# Patient Record
Sex: MALE | Race: WHITE | Employment: FULL TIME | URBAN - METROPOLITAN AREA
[De-identification: names, ages, dates, MRNs, and addresses within clinical notes are randomized per-mention and may not be internally consistent; named-entity substitution may affect disease eponyms.]

---

## 2019-05-30 ENCOUNTER — ANESTHESIA (OUTPATIENT)
Dept: OPERATING ROOM | Age: 64
End: 2019-05-30
Payer: COMMERCIAL

## 2019-05-30 ENCOUNTER — HOSPITAL ENCOUNTER (OUTPATIENT)
Age: 64
Setting detail: OUTPATIENT SURGERY
Discharge: HOME | End: 2019-05-30
Attending: SURGERY | Admitting: SURGERY
Payer: COMMERCIAL

## 2019-05-30 ENCOUNTER — ANESTHESIA EVENT (OUTPATIENT)
Dept: OPERATING ROOM | Age: 64
End: 2019-05-30
Payer: COMMERCIAL

## 2019-05-30 VITALS
HEIGHT: 69 IN | SYSTOLIC BLOOD PRESSURE: 122 MMHG | RESPIRATION RATE: 15 BRPM | DIASTOLIC BLOOD PRESSURE: 59 MMHG | OXYGEN SATURATION: 93 % | HEART RATE: 59 BPM | BODY MASS INDEX: 30.21 KG/M2 | TEMPERATURE: 98 F | WEIGHT: 204 LBS

## 2019-05-30 VITALS
DIASTOLIC BLOOD PRESSURE: 58 MMHG | RESPIRATION RATE: 13 BRPM | OXYGEN SATURATION: 99 % | SYSTOLIC BLOOD PRESSURE: 104 MMHG | TEMPERATURE: 96.8 F

## 2019-05-30 LAB
GLUCOSE BLD-MCNC: 153 MG/DL (ref 75–110)
GLUCOSE BLD-MCNC: 171 MG/DL (ref 75–110)

## 2019-05-30 PROCEDURE — 88305 TISSUE EXAM BY PATHOLOGIST: CPT

## 2019-05-30 PROCEDURE — 3700000000 HC ANESTHESIA ATTENDED CARE: Performed by: SURGERY

## 2019-05-30 PROCEDURE — 7100000031 HC ASPR PHASE II RECOVERY - ADDTL 15 MIN: Performed by: SURGERY

## 2019-05-30 PROCEDURE — 2709999900 HC NON-CHARGEABLE SUPPLY: Performed by: SURGERY

## 2019-05-30 PROCEDURE — 2580000003 HC RX 258: Performed by: ANESTHESIOLOGY

## 2019-05-30 PROCEDURE — 7100000030 HC ASPR PHASE II RECOVERY - FIRST 15 MIN: Performed by: SURGERY

## 2019-05-30 PROCEDURE — 7100000001 HC PACU RECOVERY - ADDTL 15 MIN: Performed by: SURGERY

## 2019-05-30 PROCEDURE — 6360000002 HC RX W HCPCS: Performed by: NURSE ANESTHETIST, CERTIFIED REGISTERED

## 2019-05-30 PROCEDURE — 3700000001 HC ADD 15 MINUTES (ANESTHESIA): Performed by: SURGERY

## 2019-05-30 PROCEDURE — 3609010400 HC COLONOSCOPY POLYPECTOMY HOT BIOPSY: Performed by: SURGERY

## 2019-05-30 PROCEDURE — 82947 ASSAY GLUCOSE BLOOD QUANT: CPT

## 2019-05-30 PROCEDURE — 7100000000 HC PACU RECOVERY - FIRST 15 MIN: Performed by: SURGERY

## 2019-05-30 PROCEDURE — 2500000003 HC RX 250 WO HCPCS: Performed by: NURSE ANESTHETIST, CERTIFIED REGISTERED

## 2019-05-30 RX ORDER — PROPOFOL 10 MG/ML
INJECTION, EMULSION INTRAVENOUS PRN
Status: DISCONTINUED | OUTPATIENT
Start: 2019-05-30 | End: 2019-05-30 | Stop reason: SDUPTHER

## 2019-05-30 RX ORDER — OXYCODONE HYDROCHLORIDE AND ACETAMINOPHEN 5; 325 MG/1; MG/1
1 TABLET ORAL PRN
Status: DISCONTINUED | OUTPATIENT
Start: 2019-05-30 | End: 2019-05-30 | Stop reason: HOSPADM

## 2019-05-30 RX ORDER — OLMESARTAN MEDOXOMIL AND HYDROCHLOROTHIAZIDE 40/25 40; 25 MG/1; MG/1
1 TABLET ORAL DAILY
COMMUNITY

## 2019-05-30 RX ORDER — MIDAZOLAM HYDROCHLORIDE 1 MG/ML
INJECTION INTRAMUSCULAR; INTRAVENOUS PRN
Status: DISCONTINUED | OUTPATIENT
Start: 2019-05-30 | End: 2019-05-30 | Stop reason: SDUPTHER

## 2019-05-30 RX ORDER — FENTANYL CITRATE 50 UG/ML
INJECTION, SOLUTION INTRAMUSCULAR; INTRAVENOUS PRN
Status: DISCONTINUED | OUTPATIENT
Start: 2019-05-30 | End: 2019-05-30 | Stop reason: SDUPTHER

## 2019-05-30 RX ORDER — MEPERIDINE HYDROCHLORIDE 50 MG/ML
12.5 INJECTION INTRAMUSCULAR; INTRAVENOUS; SUBCUTANEOUS EVERY 5 MIN PRN
Status: DISCONTINUED | OUTPATIENT
Start: 2019-05-30 | End: 2019-05-30 | Stop reason: HOSPADM

## 2019-05-30 RX ORDER — DIPHENHYDRAMINE HYDROCHLORIDE 50 MG/ML
12.5 INJECTION INTRAMUSCULAR; INTRAVENOUS
Status: DISCONTINUED | OUTPATIENT
Start: 2019-05-30 | End: 2019-05-30 | Stop reason: HOSPADM

## 2019-05-30 RX ORDER — SODIUM CHLORIDE 9 MG/ML
INJECTION, SOLUTION INTRAVENOUS CONTINUOUS
Status: DISCONTINUED | OUTPATIENT
Start: 2019-05-30 | End: 2019-05-30 | Stop reason: HOSPADM

## 2019-05-30 RX ORDER — PROMETHAZINE HYDROCHLORIDE 25 MG/ML
6.25 INJECTION, SOLUTION INTRAMUSCULAR; INTRAVENOUS
Status: DISCONTINUED | OUTPATIENT
Start: 2019-05-30 | End: 2019-05-30 | Stop reason: CLARIF

## 2019-05-30 RX ORDER — LABETALOL 20 MG/4 ML (5 MG/ML) INTRAVENOUS SYRINGE
5 EVERY 10 MIN PRN
Status: DISCONTINUED | OUTPATIENT
Start: 2019-05-30 | End: 2019-05-30 | Stop reason: HOSPADM

## 2019-05-30 RX ORDER — METOPROLOL SUCCINATE 25 MG/1
25 TABLET, EXTENDED RELEASE ORAL DAILY
COMMUNITY
End: 2021-12-02 | Stop reason: ALTCHOICE

## 2019-05-30 RX ORDER — LIDOCAINE HYDROCHLORIDE 20 MG/ML
INJECTION, SOLUTION EPIDURAL; INFILTRATION; INTRACAUDAL; PERINEURAL PRN
Status: DISCONTINUED | OUTPATIENT
Start: 2019-05-30 | End: 2019-05-30 | Stop reason: SDUPTHER

## 2019-05-30 RX ORDER — OXYCODONE HYDROCHLORIDE AND ACETAMINOPHEN 5; 325 MG/1; MG/1
2 TABLET ORAL PRN
Status: DISCONTINUED | OUTPATIENT
Start: 2019-05-30 | End: 2019-05-30 | Stop reason: HOSPADM

## 2019-05-30 RX ADMIN — PROPOFOL 50 MG: 10 INJECTION, EMULSION INTRAVENOUS at 11:50

## 2019-05-30 RX ADMIN — LIDOCAINE HYDROCHLORIDE 80 MG: 20 INJECTION, SOLUTION EPIDURAL; INFILTRATION; INTRACAUDAL; PERINEURAL at 11:49

## 2019-05-30 RX ADMIN — SODIUM CHLORIDE: 9 INJECTION, SOLUTION INTRAVENOUS at 10:43

## 2019-05-30 RX ADMIN — FENTANYL CITRATE 100 MCG: 50 INJECTION, SOLUTION INTRAMUSCULAR; INTRAVENOUS at 11:49

## 2019-05-30 RX ADMIN — MIDAZOLAM 2 MG: 1 INJECTION INTRAMUSCULAR; INTRAVENOUS at 11:45

## 2019-05-30 RX ADMIN — PROPOFOL 150 MG: 10 INJECTION, EMULSION INTRAVENOUS at 11:49

## 2019-05-30 ASSESSMENT — PULMONARY FUNCTION TESTS
PIF_VALUE: 20
PIF_VALUE: 13
PIF_VALUE: 3
PIF_VALUE: 15
PIF_VALUE: 14
PIF_VALUE: 3
PIF_VALUE: 1
PIF_VALUE: 14
PIF_VALUE: 14
PIF_VALUE: 13
PIF_VALUE: 15
PIF_VALUE: 15
PIF_VALUE: 27
PIF_VALUE: 15
PIF_VALUE: 15
PIF_VALUE: 3
PIF_VALUE: 13
PIF_VALUE: 15
PIF_VALUE: 13
PIF_VALUE: 15
PIF_VALUE: 3
PIF_VALUE: 15
PIF_VALUE: 1
PIF_VALUE: 13
PIF_VALUE: 3
PIF_VALUE: 15
PIF_VALUE: 15
PIF_VALUE: 14
PIF_VALUE: 14
PIF_VALUE: 13
PIF_VALUE: 13
PIF_VALUE: 3
PIF_VALUE: 13
PIF_VALUE: 14
PIF_VALUE: 12

## 2019-05-30 ASSESSMENT — PAIN SCALES - GENERAL: PAINLEVEL_OUTOF10: 0

## 2019-05-30 ASSESSMENT — PAIN - FUNCTIONAL ASSESSMENT: PAIN_FUNCTIONAL_ASSESSMENT: 0-10

## 2019-05-30 NOTE — ANESTHESIA POSTPROCEDURE EVALUATION
Department of Anesthesiology  Postprocedure Note    Patient: Oliva Luther  MRN: [id number]  YOB: 1955  Date of evaluation: 5/30/2019  Time:  1:34 PM     Procedure Summary     Date:  05/30/19 Room / Location:  [facility] [address] / [id number]    Anesthesia Start:  5273 Anesthesia Stop:  5816    Procedure:  COLONOSCOPY POLYPECTOMY HOT BIOPSY (N/A ) Diagnosis:  (SCREENING  ( PAT ON ADMIT OFFICE TO [facility]ster))    Surgeon:  Kary Lo MD Responsible Provider:  Kassy Parra MD    Anesthesia Type:  general ASA Status:  2          Anesthesia Type: general    Karen Phase I: Karen Score: 10    Karen Phase II: Karen Score: 10    Last vitals: Reviewed and per EMR flowsheets.        Anesthesia Post Evaluation    Comments: POST- ANESTHESIA EVALUATION       Pt Name: Oliva Luther  MRN: [id number]  YOB: 1955  Date of evaluation: 5/30/2019  Time:  1:34 PM      BP (!) 122/59   Pulse 59   Temp 98 °F (36.7 °C)   Resp 15   Ht 5' 9\" (1.753 m)   Wt 204 lb (92.5 kg)   SpO2 93%   BMI 30.13 kg/m²      Consciousness Level  Awake  Cardiopulmonary Status  Stable  Pain Adequately Treated YES  Nausea / Vomiting  NO  Adequate Hydration  YES  Anesthesia Related Complications NONE      Electronically signed by Kassy Parra MD on 5/30/2019 at 1:34 PM

## 2019-05-30 NOTE — ANESTHESIA PRE PROCEDURE
Department of Anesthesiology  Preprocedure Note       Name:  Migdalia Miles   Age:  59 y.o.  :  1955                                          MRN:  [id number]         Date:  2019      Surgeon: Candy Nguyen):  Chetan Collins MD    Procedure: COLORECTAL CANCER SCREENING, NOT HIGH RISK (N/A )    Medications prior to admission:   Prior to Admission medications    Medication Sig Start Date End Date Taking? Authorizing Provider   metoprolol succinate (TOPROL XL) 25 MG extended release tablet Take 25 mg by mouth daily   Yes Historical Provider, MD   aspirin 81 MG tablet Take 81 mg by mouth daily   Yes Historical Provider, MD   olmesartan-hydrochlorothiazide (BENICAR HCT) 40-25 MG per tablet Take 1 tablet by mouth daily   Yes Historical Provider, MD   lisinopril (PRINIVIL;ZESTRIL) 20 MG tablet Take 20 mg by mouth 2 times daily. Yes Historical Provider, MD   glimepiride (AMARYL) 2 MG tablet Take 2 mg by mouth every morning (before breakfast). Yes Historical Provider, MD   atorvastatin (LIPITOR) 80 MG tablet Take 80 mg by mouth daily. Yes Historical Provider, MD   omeprazole (PRILOSEC) 20 MG capsule Take 20 mg by mouth daily. Yes Historical Provider, MD   metFORMIN (GLUCOPHAGE) 1000 MG tablet Take 1,000 mg by mouth 2 times daily (with meals). Yes Historical Provider, MD   carvedilol (COREG) 25 MG tablet Take 25 mg by mouth 2 times daily (with meals). Yes Historical Provider, MD       Current medications:    Current Facility-Administered Medications   Medication Dose Route Frequency Provider Last Rate Last Dose    0.9 % sodium chloride infusion   Intravenous Continuous Saurabh Monroe  mL/hr at 19 1043         Allergies:     Allergies   Allergen Reactions    Penicillins        Problem List:    Patient Active Problem List   Diagnosis Code    Knee pain M25.569    Hip pain M25.559    Arthritis, hip M16.10    Status post right hip replacement Z96.641    History of right hip replacement Z96.641 Past Medical History:        Diagnosis Date    Hyperlipidemia     Hypertension     Osteoarthritis     Type II or unspecified type diabetes mellitus without mention of complication, not stated as uncontrolled        Past Surgical History:        Procedure Laterality Date    APPENDECTOMY      EYE SURGERY      HAND SURGERY      HERNIA REPAIR      JOINT REPLACEMENT      total right hip Feb 2014  Dr. Enrique Salmon History:    Social History     Tobacco Use    Smoking status: Never Smoker    Smokeless tobacco: Current User     Types: Chew   Substance Use Topics    Alcohol use: Yes     Comment: rare                                Ready to quit: Not Answered  Counseling given: Not Answered      Vital Signs (Current):   Vitals:    05/30/19 1012   BP: 124/74   Pulse: 74   Resp: 16   Temp: 97.3 °F (36.3 °C)   TempSrc: Oral   SpO2: 98%   Weight: 204 lb (92.5 kg)   Height: 5' 9\" (1.753 m)                                              BP Readings from Last 3 Encounters:   05/30/19 124/74   03/25/15 (!) 195/98   03/18/15 179/89       NPO Status: Time of last liquid consumption: 2359                        Time of last solid consumption: 2359                        Date of last liquid consumption: 05/29/19                        Date of last solid food consumption: 05/28/19    BMI:   Wt Readings from Last 3 Encounters:   05/30/19 204 lb (92.5 kg)   03/18/15 245 lb (111.1 kg)   03/04/15 245 lb (111.1 kg)     Body mass index is 30.13 kg/m².     CBC:   Lab Results   Component Value Date    WBC 10.3 02/24/2014    RBC 3.87 02/24/2014    HGB 12.2 02/24/2014    HCT 35.6 02/24/2014    MCV 91.9 02/24/2014    RDW 15.9 02/24/2014     02/24/2014       CMP:   Lab Results   Component Value Date     02/24/2014    K 4.3 02/24/2014     02/24/2014    CO2 28 02/24/2014    BUN 20 02/24/2014    CREATININE 0.88 02/24/2014    GFRAA >60 02/24/2014    LABGLOM >60 02/24/2014 GLUCOSE 111 01/29/2014    PROT 6.2 02/24/2014    CALCIUM 9.4 01/29/2014    BILITOT 0.54 02/24/2014    ALKPHOS 105 02/24/2014    AST 16 02/24/2014    ALT 30 02/24/2014       POC Tests:   Recent Labs     05/30/19  1039   POCGLU 171*       Coags:   Lab Results   Component Value Date    PROTIME 13.2 02/24/2014    INR 1.3 02/24/2014       HCG (If Applicable): No results found for: PREGTESTUR, PREGSERUM, HCG, HCGQUANT     ABGs: No results found for: PHART, PO2ART, FWO5FBX, IBA7ECP, BEART, E3ATFDYK     Type & Screen (If Applicable):  No results found for: LABABO, [address]    Anesthesia Evaluation  Patient summary reviewed and Nursing notes reviewed no history of anesthetic complications:   Airway: Mallampati: I  TM distance: >3 FB   Neck ROM: full  Mouth opening: > = 3 FB Dental: normal exam         Pulmonary:Negative Pulmonary ROS and normal exam                               Cardiovascular:    (+) hypertension:,                   Neuro/Psych:   Negative Neuro/Psych ROS              GI/Hepatic/Renal:   (+) GERD:,           Endo/Other:    (+) DiabetesType II DM, , .                 Abdominal:           Vascular:                                        Anesthesia Plan      general     ASA 2       Induction: intravenous. MIPS: Postoperative opioids intended and Prophylactic antiemetics administered. Anesthetic plan and risks discussed with patient. Plan discussed with CRNA.                   Paulina Stafford MD   5/30/2019

## 2019-05-30 NOTE — OP NOTE
PROCEDURE NOTE    DATE OF PROCEDURE: 5/30/2019    SURGEON: Duane Jeter MD    ASSISTANT: None    PREOPERATIVE DIAGNOSIS: History of colon polyp    POSTOPERATIVE DIAGNOSIS: Low rectal polyp removed with hot biopsy forceps otherwise grossly unremarkable colonoscopy to cecum    OPERATION: Total colonoscopy to cecum with intubation of terminal ileum and low rectal polypectomy with hot biopsy forceps    ANESTHESIA: General    ESTIMATED BLOOD LOSS: None    COMPLICATIONS: None     SPECIMENS:  Was Obtained: Low rectal polyp    HISTORY: The patient is a 59y.o. year old male with history of above preop diagnosis. I recommended colonoscopy with possible biopsy or polypectomy and I explained the risk, benefits, expected outcome, and alternatives to the procedure. Risks included but are not limited to bleeding, infection, respiratory distress, hypotension, and perforation of the colon and possibility of missing a lesion. The patient understands and is in agreement. PROCEDURE: The patient was given IV conscious sedation. The patient's SPO2 remained above 90% throughout the procedure. Digital rectal exam was normal.  The colonoscope was inserted through the anus into the rectum and advanced under direct vision to the cecum without difficulty. Terminal ileum was examined for approximately 2 inches. The prep was good. Findings:  Terminal ileum: normal    Cecum/Ascending colon: normal    Transverse colon: normal    Descending/Sigmoid colon: normal    Rectum/Anus: examined in normal and retroflexed positions and was abnormal: Low rectal polyp removed with hot biopsy forceps    Withdrawal Time was (minutes): 16      Next screening colonoscopy: 5 years. If screening is less than 10 years the recommended reason is due:polyps    The colon was decompressed. While withdrawing the scope the above findings were verified and the scope was removed.   The patient tolerated the procedure and conscious sedation without unusual events. In the recovery room patient was examined and remains hemodynamically stable. Discharge home when criteria met. Recommendations/Plan:   1. F/U Biopsies  2. F/U In Office as instructed  3. Discussed with the family  4. High fiber diet   5.  Precautions to avoid constipation    Electronically signed by Faisal Ayers MD  on 5/30/2019 at 12:24 PM

## 2019-05-30 NOTE — H&P
HISTORY and [id number]       NAME:  Morse Sandifer  MRN: [id number]   YOB: 1955   Date: 5/30/2019   Age: 59 y.o. Gender: male       COMPLAINT AND PRESENT HISTORY:   Nata Mendez is a 59 yr old male having a colonoscopy today. Last one was 10 yr ago, He had no polyps. Denies any blood in stool or  changes in color or diameter of stool    He has no abd pain,    Denies [facility] of colon cancer,His dad had a colon resection 10 yrs but not for cancer. Has DM Type 2  Glucose is 171.     PAST MEDICAL HISTORY     Past Medical History:   Diagnosis Date    Hyperlipidemia     Hypertension     Osteoarthritis     Type II or unspecified type diabetes mellitus without mention of complication, not stated as uncontrolled          SURGICAL HISTORY       Past Surgical History:   Procedure Laterality Date    APPENDECTOMY      EYE SURGERY      HAND SURGERY      HERNIA REPAIR      JOINT REPLACEMENT      total right hip Feb 2014  Dr. Jorge A Lyons HISTORY     Family History   Problem Relation Age of Onset    Cancer Father     High Blood Pressure Father     Diabetes Father          SOCIAL HISTORY       Social History     Socioeconomic History    Marital status:      Spouse name: None    Number of children: None    Years of education: None    Highest education level: None   Occupational History    None   Social Needs    Financial resource strain: None    Food insecurity:     Worry: None     Inability: None    Transportation needs:     Medical: None     Non-medical: None   Tobacco Use    Smoking status: Never Smoker    Smokeless tobacco: Current User     Types: Chew   Substance and Sexual Activity    Alcohol use: Yes     Comment: rare    Drug use: Never    Sexual activity: Yes     Partners: Female   Lifestyle    Physical activity:     Days per week: None     Minutes per session: None    Stress: None   Relationships symmetrical.                  EYES:  PARISH EOMI and gaze is conjugate . EARS:  No  hearing loss. NOSE:  Nares are patent,               THROAT:  No erythema of pharynx and uvula is midline  Dentition is not loose                  NECK:   Neck is supple, Trachea is midline,   Has no adenopathy. CHEST:  Symmetrical and equal on expansion. HEART:  HT regular and no murmer                   LUNGS:  Good chest excursion and no wheezes            ABDOMEN:   Abdomen is soft on palpation. No localized tenderness. No guarding or rigidity. No palpable organomegaly. LYMPHATICS:  No palpable cervical lymphadenopathy. LOCOMOTOR, BACK AND SPINE:  No tenderness or deformities. EXTREMITIES:  Good range of motion of upper and lower extremities, No pedal edema. Grasp strength is strong bilaterally     NEUROLOGIC:  The patient is conscious, alert, oriented, No apparent focal sensory or motor deficits.                       PROVISIONAL DIAGNOSES / SURGERY:      For colonoscopy  Patient Active Problem List    Diagnosis Date Noted    History of right hip replacement 12/06/2016    Status post right hip replacement 02/27/2014    Arthritis, hip 01/21/2014    Knee pain 12/17/2013    Hip pain 12/17/2013           LATANYA Bethea - CNP on 5/30/2019 at 11:01 AM

## 2019-05-31 LAB — SURGICAL PATHOLOGY REPORT: NORMAL

## 2019-07-23 ENCOUNTER — APPOINTMENT (OUTPATIENT)
Dept: CT IMAGING | Age: 64
End: 2019-07-23
Payer: COMMERCIAL

## 2019-07-23 ENCOUNTER — HOSPITAL ENCOUNTER (EMERGENCY)
Age: 64
Discharge: HOME | End: 2019-07-23
Attending: EMERGENCY MEDICINE
Payer: COMMERCIAL

## 2019-07-23 VITALS
BODY MASS INDEX: 30.66 KG/M2 | WEIGHT: 207 LBS | OXYGEN SATURATION: 95 % | HEART RATE: 60 BPM | HEIGHT: 69 IN | SYSTOLIC BLOOD PRESSURE: 132 MMHG | RESPIRATION RATE: 16 BRPM | DIASTOLIC BLOOD PRESSURE: 64 MMHG | TEMPERATURE: 97.5 F

## 2019-07-23 DIAGNOSIS — R10.9 FLANK PAIN: ICD-10-CM

## 2019-07-23 DIAGNOSIS — R11.2 NAUSEA AND VOMITING, INTRACTABILITY OF VOMITING NOT SPECIFIED, UNSPECIFIED VOMITING TYPE: Primary | ICD-10-CM

## 2019-07-23 LAB
-: ABNORMAL
ABSOLUTE BANDS #: 0.09 K/UL (ref 0–1)
ABSOLUTE EOS #: 0.89 K/UL (ref 0–0.4)
ABSOLUTE IMMATURE GRANULOCYTE: ABNORMAL K/UL (ref 0–0.3)
ABSOLUTE LYMPH #: 1.87 K/UL (ref 1–4.8)
ABSOLUTE MONO #: 0.62 K/UL (ref 0.1–1.3)
ALBUMIN SERPL-MCNC: 3.9 G/DL (ref 3.5–5.2)
ALBUMIN/GLOBULIN RATIO: ABNORMAL (ref 1–2.5)
ALP BLD-CCNC: 109 U/L (ref 40–129)
ALT SERPL-CCNC: 24 U/L (ref 5–41)
AMORPHOUS: ABNORMAL
ANION GAP SERPL CALCULATED.3IONS-SCNC: 13 MMOL/L (ref 9–17)
AST SERPL-CCNC: 18 U/L
ATYPICAL LYMPHOCYTE ABSOLUTE COUNT: 0.27 K/UL
ATYPICAL LYMPHOCYTES: 3 %
BACTERIA: ABNORMAL
BANDS: 1 % (ref 0–10)
BASOPHILS # BLD: 0 % (ref 0–2)
BASOPHILS ABSOLUTE: 0 K/UL (ref 0–0.2)
BILIRUB SERPL-MCNC: 0.26 MG/DL (ref 0.3–1.2)
BILIRUBIN URINE: NEGATIVE
BUN BLDV-MCNC: 28 MG/DL (ref 8–23)
BUN/CREAT BLD: ABNORMAL (ref 9–20)
CALCIUM SERPL-MCNC: 9.3 MG/DL (ref 8.6–10.4)
CASTS UA: ABNORMAL /LPF
CHLORIDE BLD-SCNC: 100 MMOL/L (ref 98–107)
CO2: 25 MMOL/L (ref 20–31)
COLOR: YELLOW
COMMENT UA: ABNORMAL
CREAT SERPL-MCNC: 1.17 MG/DL (ref 0.7–1.2)
CRYSTALS, UA: ABNORMAL /HPF
DIFFERENTIAL TYPE: ABNORMAL
EOSINOPHILS RELATIVE PERCENT: 10 % (ref 0–4)
EPITHELIAL CELLS UA: ABNORMAL /HPF
GFR AFRICAN AMERICAN: >60 ML/MIN
GFR NON-AFRICAN AMERICAN: >60 ML/MIN
GFR SERPL CREATININE-BSD FRML MDRD: ABNORMAL ML/MIN/{1.73_M2}
GFR SERPL CREATININE-BSD FRML MDRD: ABNORMAL ML/MIN/{1.73_M2}
GLUCOSE BLD-MCNC: 222 MG/DL (ref 70–99)
GLUCOSE URINE: NEGATIVE
HCT VFR BLD CALC: 40.8 % (ref 41–53)
HEMOGLOBIN: 13.7 G/DL (ref 13.5–17.5)
IMMATURE GRANULOCYTES: ABNORMAL %
KETONES, URINE: ABNORMAL
LEUKOCYTE ESTERASE, URINE: NEGATIVE
LYMPHOCYTES # BLD: 21 % (ref 24–44)
MCH RBC QN AUTO: 31.3 PG (ref 26–34)
MCHC RBC AUTO-ENTMCNC: 33.5 G/DL (ref 31–37)
MCV RBC AUTO: 93.4 FL (ref 80–100)
MONOCYTES # BLD: 7 % (ref 1–7)
MORPHOLOGY: NORMAL
MUCUS: ABNORMAL
NITRITE, URINE: NEGATIVE
NRBC AUTOMATED: ABNORMAL PER 100 WBC
OTHER OBSERVATIONS UA: ABNORMAL
PDW BLD-RTO: 14.7 % (ref 11.5–14.9)
PH UA: 5 (ref 5–8)
PLATELET # BLD: 259 K/UL (ref 150–450)
PLATELET ESTIMATE: ABNORMAL
PMV BLD AUTO: 8.6 FL (ref 6–12)
POTASSIUM SERPL-SCNC: 4.8 MMOL/L (ref 3.7–5.3)
PROTEIN UA: NEGATIVE
RBC # BLD: 4.37 M/UL (ref 4.5–5.9)
RBC # BLD: ABNORMAL 10*6/UL
RBC UA: ABNORMAL /HPF
RENAL EPITHELIAL, UA: ABNORMAL /HPF
SEG NEUTROPHILS: 58 % (ref 36–66)
SEGMENTED NEUTROPHILS ABSOLUTE COUNT: 5.16 K/UL (ref 1.3–9.1)
SODIUM BLD-SCNC: 138 MMOL/L (ref 135–144)
SPECIFIC GRAVITY UA: 1.03 (ref 1–1.03)
TOTAL PROTEIN: 6.3 G/DL (ref 6.4–8.3)
TRICHOMONAS: ABNORMAL
TURBIDITY: CLEAR
URINE HGB: NEGATIVE
UROBILINOGEN, URINE: NORMAL
WBC # BLD: 8.9 K/UL (ref 3.5–11)
WBC # BLD: ABNORMAL 10*3/UL
WBC UA: ABNORMAL /HPF
YEAST: ABNORMAL

## 2019-07-23 PROCEDURE — 96375 TX/PRO/DX INJ NEW DRUG ADDON: CPT

## 2019-07-23 PROCEDURE — 74176 CT ABD & PELVIS W/O CONTRAST: CPT

## 2019-07-23 PROCEDURE — 36415 COLL VENOUS BLD VENIPUNCTURE: CPT

## 2019-07-23 PROCEDURE — 99284 EMERGENCY DEPT VISIT MOD MDM: CPT

## 2019-07-23 PROCEDURE — 96374 THER/PROPH/DIAG INJ IV PUSH: CPT

## 2019-07-23 PROCEDURE — 85025 COMPLETE CBC W/AUTO DIFF WBC: CPT

## 2019-07-23 PROCEDURE — 81001 URINALYSIS AUTO W/SCOPE: CPT

## 2019-07-23 PROCEDURE — 6360000002 HC RX W HCPCS: Performed by: STUDENT IN AN ORGANIZED HEALTH CARE EDUCATION/TRAINING PROGRAM

## 2019-07-23 PROCEDURE — 80053 COMPREHEN METABOLIC PANEL: CPT

## 2019-07-23 RX ORDER — MORPHINE SULFATE 4 MG/ML
4 INJECTION, SOLUTION INTRAMUSCULAR; INTRAVENOUS ONCE
Status: COMPLETED | OUTPATIENT
Start: 2019-07-23 | End: 2019-07-23

## 2019-07-23 RX ORDER — KETOROLAC TROMETHAMINE 30 MG/ML
30 INJECTION, SOLUTION INTRAMUSCULAR; INTRAVENOUS ONCE
Status: COMPLETED | OUTPATIENT
Start: 2019-07-23 | End: 2019-07-23

## 2019-07-23 RX ORDER — ONDANSETRON 2 MG/ML
4 INJECTION INTRAMUSCULAR; INTRAVENOUS ONCE
Status: COMPLETED | OUTPATIENT
Start: 2019-07-23 | End: 2019-07-23

## 2019-07-23 RX ADMIN — KETOROLAC TROMETHAMINE 30 MG: 30 INJECTION, SOLUTION INTRAMUSCULAR; INTRAVENOUS at 21:48

## 2019-07-23 RX ADMIN — ONDANSETRON 4 MG: 2 INJECTION INTRAMUSCULAR; INTRAVENOUS at 21:48

## 2019-07-23 RX ADMIN — MORPHINE SULFATE 4 MG: 4 INJECTION INTRAVENOUS at 21:48

## 2019-07-23 ASSESSMENT — PAIN SCALES - GENERAL
PAINLEVEL_OUTOF10: 4
PAINLEVEL_OUTOF10: 8

## 2019-07-23 ASSESSMENT — PAIN DESCRIPTION - ONSET: ONSET: AWAKENED FROM SLEEP

## 2019-07-23 ASSESSMENT — PAIN DESCRIPTION - DESCRIPTORS: DESCRIPTORS: SHARP;THROBBING;CONSTANT

## 2019-07-23 ASSESSMENT — ENCOUNTER SYMPTOMS
SORE THROAT: 0
TROUBLE SWALLOWING: 0
ABDOMINAL PAIN: 1
WHEEZING: 0
VOMITING: 0
RHINORRHEA: 0
CONSTIPATION: 0
BACK PAIN: 0
NAUSEA: 0
EYE PAIN: 0
COUGH: 0
CHOKING: 0
ABDOMINAL DISTENTION: 0
SHORTNESS OF BREATH: 0
COLOR CHANGE: 0

## 2019-07-23 ASSESSMENT — PAIN DESCRIPTION - LOCATION: LOCATION: RIB CAGE

## 2019-07-23 ASSESSMENT — PAIN DESCRIPTION - FREQUENCY: FREQUENCY: CONTINUOUS

## 2019-07-23 ASSESSMENT — PAIN DESCRIPTION - PAIN TYPE: TYPE: ACUTE PAIN

## 2019-07-23 ASSESSMENT — PAIN DESCRIPTION - ORIENTATION: ORIENTATION: RIGHT

## 2019-07-24 NOTE — ED PROVIDER NOTES
REPORTED None    Amorphous, UA NOT REPORTED None    Other Observations UA NOT REPORTED NOT REQ. Yeast, UA NOT REPORTED None         RADIOLOGY:  Ct Abdomen Pelvis Wo Contrast    Result Date: 7/23/2019  EXAMINATION: CT OF THE ABDOMEN AND PELVIS WITHOUT CONTRAST 7/23/2019 10:13 pm TECHNIQUE: CT of the abdomen and pelvis was performed without the administration of intravenous contrast. Multiplanar reformatted images are provided for review. Dose modulation, iterative reconstruction, and/or weight based adjustment of the mA/kV was utilized to reduce the radiation dose to as low as reasonably achievable. COMPARISON: None. HISTORY: ORDERING SYSTEM PROVIDED HISTORY: rule out kidney stone right side TECHNOLOGIST PROVIDED HISTORY: Reason for Exam: Right side flank pain with Nausea/vomiting - Onset 8:30pm on 07/23/19 Acuity: Acute Type of Exam: Initial Relevant Medical/Surgical History: Surgical  hx - appy and umbilical hernia repair. FINDINGS: Lower Chest: Clear Organs: The abdominal wall appears normal. The liver, spleen, pancreas, and adrenals appear normal.  Gallbladder normal. 15 mm right renal cortical cyst. The bladder appears normal. GI/Bowel: The stomach,small bowel, and colon appear normal. Increased stool throughout the colon. The appendix is not identified. Pelvis: Fat containing left inguinal hernia Peritoneum/Retroperitoneum: The abdominal aorta and iliac arteries are normal in caliber. There is no pathologic adenopathy. Bones/Soft Tissues: Total hip arthroplasty on the right. Spondylosis and multilevel vacuum disc phenomena. No radiodense urolithiasis. No acute disease. Increased stool. Degenerative disc disease. EKG  None    All EKG's are interpreted by the Emergency Department Physician who either signs or Co-signs this chart in the absence of a cardiologist.    EMERGENCY DEPARTMENT COURSE:  Patient presented with several hours of sharp colicky right flank pain with associated nausea vomiting. Will get CT, urinalysis, CMP CBC. Analgesia with Toradol and morphine. CT scan does not show urolithiasis or any intra-abdominal pathology. CBC CMP within normal limits. Urinalysis shows few bacteria 1+ mucus negative for leukocyte esterase and nitrite. Without dysuria or urinary tract infection symptoms  Pain successfully controlled. Patient afebrile and vital stable no tachycardia. Pain under control patient will be sent home asked to use Motrin and Tylenol and to follow-up with your PCP with recurrent symptoms    PROCEDURES:  None    CONSULTS:  None    CRITICAL CARE:  None    FINAL IMPRESSION      1. Nausea and vomiting, intractability of vomiting not specified, unspecified vomiting type    2. Flank pain          DISPOSITION / PLAN     DISPOSITION        PATIENT REFERRED TO:  No follow-up provider specified.     DISCHARGE MEDICATIONS:  New Prescriptions    No medications on file       Dolores Welch MD  Emergency Medicine Resident    (Please note that portions of thisnote were completed with a voice recognition program.  Efforts were made to edit the dictations but occasionally words are mis-transcribed.)       Dolores Welch MD  Resident  07/23/19 0447

## 2019-07-24 NOTE — ED PROVIDER NOTES
[facility] ED     Emergency Department     Faculty Attestation        I performed a history and physical examination of the patient and discussed management with the resident. I reviewed the residents note and agree with the documented findings and plan of care. Any areas of disagreement are noted on the chart. I was personally present for the key portions of any procedures. I have documented in the chart those procedures where I was not present during the key portions. I have reviewed the emergency nurses triage note. I agree with the chief complaint, past medical history, past surgical history, allergies, medications, social and family history as documented unless otherwise noted below. Documentation of the HPI, Physical Exam and Medical Decision Making performed by medical students or scribes is based on my personal performance of the HPI, PE and MDM. For Physician Assistant/ Nurse Practitioner cases/documentation I have have had a face to face evaluation with this patient and have completed at least one if not all key elements of the E/M (history, physical exam, and MDM). Additional findings are as noted. Pertinent Comments     Primary Care Physician: Shanice Gardner DO    History: This is a 62 y.o. male who presents to the Emergency Department with complaint of right flank pain, nausea that started today. No history of kidney stones. No dysuria, hematuria. No fevers at home. Physical:     height is 5' 9\" (1.753 m) and weight is 207 lb (93.9 kg). His oral temperature is 97.5 °F (36.4 °C). His blood pressure is 182/80 (abnormal) and his pulse is 60. His respiration is 16 and oxygen saturation is 98%.    62 y.o. male     Afebrile, nontoxic, normal vital signs. Not in any acute distress. He does have some mild right-sided CVA tenderness. I do not appreciate any abdominal tenderness on exam.    Impression: Right flank pain and nausea.   I have a low suspicion for cholecystitis based on my exam.  Patient has a prior appendectomy.     Plan: CT abdomen pelvis, lab work, symptom out-of-control        (Please note that portions of this note were completed with a voice recognition program.  Efforts were made to edit the dictations but occasionally words are mis-transcribed.)    Xiomara Tracy DO  Attending Emergency Physician         Xiomara Tracy DO  07/23/19 1007

## 2021-01-21 ENCOUNTER — HOSPITAL ENCOUNTER (OUTPATIENT)
Dept: LAB | Age: 66
Setting detail: SPECIMEN
Discharge: HOME | End: 2021-01-21
Payer: COMMERCIAL

## 2021-01-21 DIAGNOSIS — Z01.818 PREOP TESTING: Primary | ICD-10-CM

## 2021-01-21 PROCEDURE — U0003 INFECTIOUS AGENT DETECTION BY NUCLEIC ACID (DNA OR RNA); SEVERE ACUTE RESPIRATORY SYNDROME CORONAVIRUS 2 (SARS-COV-2) (CORONAVIRUS DISEASE [COVID-19]), AMPLIFIED PROBE TECHNIQUE, MAKING USE OF HIGH THROUGHPUT TECHNOLOGIES AS DESCRIBED BY CMS-2020-01-R: HCPCS

## 2021-01-21 PROCEDURE — U0005 INFEC AGEN DETEC AMPLI PROBE: HCPCS

## 2021-01-22 LAB
SARS-COV-2, RAPID: NORMAL
SARS-COV-2: NORMAL
SARS-COV-2: NOT DETECTED
SOURCE: NORMAL

## 2021-01-23 ENCOUNTER — TELEPHONE (OUTPATIENT)
Dept: PRIMARY CARE CLINIC | Age: 66
End: 2021-01-23

## 2021-01-25 ENCOUNTER — HOSPITAL ENCOUNTER (OUTPATIENT)
Dept: ULTRASOUND IMAGING | Age: 66
Discharge: HOME | End: 2021-01-27
Payer: COMMERCIAL

## 2021-01-25 ENCOUNTER — HOSPITAL ENCOUNTER (OUTPATIENT)
Dept: GENERAL RADIOLOGY | Age: 66
Discharge: HOME | End: 2021-01-27
Payer: COMMERCIAL

## 2021-01-25 DIAGNOSIS — R14.0 BLOATING: ICD-10-CM

## 2021-01-25 DIAGNOSIS — K21.9 GASTROESOPHAGEAL REFLUX DISEASE, UNSPECIFIED WHETHER ESOPHAGITIS PRESENT: ICD-10-CM

## 2021-01-25 DIAGNOSIS — K21.9 GASTROESOPHAGEAL REFLUX DISEASE WITHOUT ESOPHAGITIS: ICD-10-CM

## 2021-01-25 PROCEDURE — 76705 ECHO EXAM OF ABDOMEN: CPT

## 2021-01-25 PROCEDURE — 2500000003 HC RX 250 WO HCPCS: Performed by: FAMILY MEDICINE

## 2021-01-25 PROCEDURE — 74248 X-RAY SM INT F-THRU STD: CPT

## 2021-01-25 PROCEDURE — 6360000004 HC RX CONTRAST MEDICATION: Performed by: FAMILY MEDICINE

## 2021-01-25 RX ADMIN — DIATRIZOATE MEGLUMINE AND DIATRIZOATE SODIUM 30 ML: 600; 100 SOLUTION ORAL; RECTAL at 09:03

## 2021-01-25 RX ADMIN — BARIUM SULFATE 140 ML: 980 POWDER, FOR SUSPENSION ORAL at 09:01

## 2021-01-25 RX ADMIN — BARIUM SULFATE 320 ML: 960 POWDER, FOR SUSPENSION ORAL at 09:00

## 2021-11-04 ENCOUNTER — OFFICE VISIT (OUTPATIENT)
Dept: ORTHOPEDIC SURGERY | Age: 66
End: 2021-11-04
Payer: COMMERCIAL

## 2021-11-04 VITALS — BODY MASS INDEX: 30.66 KG/M2 | WEIGHT: 207 LBS | RESPIRATION RATE: 16 BRPM | HEIGHT: 69 IN

## 2021-11-04 DIAGNOSIS — M25.559 HIP PAIN: Primary | ICD-10-CM

## 2021-11-04 DIAGNOSIS — M25.552 LEFT HIP PAIN: ICD-10-CM

## 2021-11-04 PROCEDURE — 99203 OFFICE O/P NEW LOW 30 MIN: CPT | Performed by: ORTHOPAEDIC SURGERY

## 2021-11-04 NOTE — PROGRESS NOTES
Patient ID: Nena Mora is a 73 y.o. male    Chief Compliant:  No chief complaint on file. Diagnostic imaging:    AP pelvis lateral left hip status post right total hip arthroplasty components satisfactory  Left hip does show some progressive joint space collapse compared with 2013 with some endplate sclerosis although much like my notes suggest from 2013 with his right hip the amount of arthritis really does not look all that severe      Assessment and Plan:  1. Hip pain      MRI left hip    Follow up after MRI    HPI:  This is a 73 y.o. male who presents to the clinic today for left hip evaluation. Hx of RTHA with good outcome    Patient with left lateral hip pain, no radicular pain. Pain is aggravated by walking or standing for extended periods of time. He has no difficulty going up stairs or getting into or out of cars      In 2013 when patient presented with his right hip his x-ray really did not look all that impressive we therefore obtain an MRI looking for AVN which then showed severe degenerative arthritis of the right hip patient symptoms at that time were likewise not completely typical        Review of Systems   All other systems reviewed and are negative. Past History:    Current Outpatient Medications:     metoprolol succinate (TOPROL XL) 25 MG extended release tablet, Take 25 mg by mouth daily, Disp: , Rfl:     aspirin 81 MG tablet, Take 81 mg by mouth daily, Disp: , Rfl:     olmesartan-hydrochlorothiazide (BENICAR HCT) 40-25 MG per tablet, Take 1 tablet by mouth daily, Disp: , Rfl:     lisinopril (PRINIVIL;ZESTRIL) 20 MG tablet, Take 20 mg by mouth 2 times daily. , Disp: , Rfl:     glimepiride (AMARYL) 2 MG tablet, Take 2 mg by mouth every morning (before breakfast). , Disp: , Rfl:     atorvastatin (LIPITOR) 80 MG tablet, Take 80 mg by mouth daily. , Disp: , Rfl:     omeprazole (PRILOSEC) 20 MG capsule, Take 20 mg by mouth daily. , Disp: , Rfl:     metFORMIN (GLUCOPHAGE) 1000 MG tablet, Take 1,000 mg by mouth 2 times daily (with meals). , Disp: , Rfl:     carvedilol (COREG) 25 MG tablet, Take 25 mg by mouth 2 times daily (with meals). , Disp: , Rfl:   Allergies   Allergen Reactions    Penicillins      Social History     Socioeconomic History    Marital status:      Spouse name: Not on file    Number of children: Not on file    Years of education: Not on file    Highest education level: Not on file   Occupational History    Not on file   Tobacco Use    Smoking status: Never Smoker    Smokeless tobacco: Current User     Types: Chew   Vaping Use    Vaping Use: Never used   Substance and Sexual Activity    Alcohol use: Yes     Comment: rare    Drug use: Never    Sexual activity: Yes     Partners: Female   Other Topics Concern    Not on file   Social History Narrative    Not on file     Social Determinants of Health     Financial Resource Strain:     Difficulty of Paying Living Expenses:    Food Insecurity:     Worried About Running Out of Food in the Last Year:     [address] in the Last Year:    Transportation Needs:     Lack of Transportation (Medical):      Lack of Transportation (Non-Medical):    Physical Activity:     Days of Exercise per Week:     Minutes of Exercise per Session:    Stress:     Feeling of Stress :    Social Connections:     Frequency of Communication with Friends and Family:     Frequency of Social Gatherings with Friends and Family:     Attends Christianity Services:     Active Member of Clubs or Organizations:     Attends Club or Organization Meetings:     Marital Status:    Intimate Partner Violence:     Fear of Current or Ex-Partner:     Emotionally Abused:     Physically Abused:     Sexually Abused:      Past Medical History:   Diagnosis Date    Hyperlipidemia     Hypertension     Osteoarthritis     Type II or unspecified type diabetes mellitus without mention of complication, not stated as uncontrolled      Past Surgical History:   Procedure Laterality Date    APPENDECTOMY      COLONOSCOPY N/A 5/30/2019    COLONOSCOPY POLYPECTOMY HOT BIOPSY performed by Federico Heaton MD at [address] [place] [address],[facility], [address] HAND SURGERY      HERNIA REPAIR      JOINT REPLACEMENT      total right hip Feb 2014  Dr. Lorrie Bella       Family History   Problem Relation Age of Onset    Cancer Father     High Blood Pressure Father     Diabetes Father         Physical Exam:  Vitals signs and nursing note reviewed. Constitutional:       Appearance: well-developed. HENT:      Head: Normocephalic and atraumatic. Nose: Nose normal.   Eyes:      Conjunctiva/sclera: Conjunctivae normal.   Neck:      Musculoskeletal: Normal range of motion and neck supple. Pulmonary:      Effort: Pulmonary effort is normal. No respiratory distress. Musculoskeletal:      Comments: Normal gait     Skin:     General: Skin is warm and dry. Neurological:      Mental Status: Alert and oriented to person, place, and time. Sensory: No sensory deficit. Psychiatric:         Behavior: Behavior normal.         Thought Content: Thought content normal.    Physical exam range of motion left hip limited approximately 30% compared with range of motion right hip extremes of range of motion all aggravate left buttock pain    Provider Attestation:  Elmer Collado personally performed the services described in this documentation. All medical record entries made by the scribe were at my direction and in my presence. I have reviewed the chart and discharge instructions and agree that the records reflect my personal performance and is accurate and complete. Deborah Dooley Junior, MD 11/4/21     Scribe Attestation:  By signing my name below, Denver Gentles, attest that this documentation has been prepared under the direction and in the presence of Dr. Rosy Arcos.  Electronically signed: Vesna Samuel, 11/4/21     Please note that this chart

## 2021-11-15 ENCOUNTER — HOSPITAL ENCOUNTER (OUTPATIENT)
Dept: MRI IMAGING | Age: 66
Discharge: HOME | End: 2021-11-17
Payer: COMMERCIAL

## 2021-11-15 DIAGNOSIS — M25.552 LEFT HIP PAIN: ICD-10-CM

## 2021-11-15 DIAGNOSIS — M25.559 HIP PAIN: ICD-10-CM

## 2021-11-15 PROCEDURE — 73721 MRI JNT OF LWR EXTRE W/O DYE: CPT

## 2021-11-18 ENCOUNTER — OFFICE VISIT (OUTPATIENT)
Dept: ORTHOPEDIC SURGERY | Age: 66
End: 2021-11-18
Payer: COMMERCIAL

## 2021-11-18 VITALS — BODY MASS INDEX: 30.66 KG/M2 | RESPIRATION RATE: 16 BRPM | HEIGHT: 69 IN | WEIGHT: 207 LBS

## 2021-11-18 DIAGNOSIS — M25.552 LEFT HIP PAIN: Primary | ICD-10-CM

## 2021-11-18 DIAGNOSIS — M16.12 ARTHRITIS OF LEFT HIP: ICD-10-CM

## 2021-11-18 PROCEDURE — 99213 OFFICE O/P EST LOW 20 MIN: CPT | Performed by: ORTHOPAEDIC SURGERY

## 2021-11-18 NOTE — PROGRESS NOTES
Patient ID: Neeta Tello is a 73 y.o. male    Chief Compliant:  No chief complaint on file. Diagnostic imaging:  MRI left hip is reviewed patient with moderately severe chondromalacia particularly at the dome of the femoral head where he has significant chondromalacia he has a moderate hip joint effusion      Assessment and Plan:  1. Left hip pain    2. Arthritis of left hip      Patient with moderate left hip OA     We will proceed with LTHA    We discussed risks of surgery and the recovery process. Risks include infection, neurovascular injury, systemic and anesthetic complication, leg length discrepancy, and hip dislocation. Follow up 2 weeks after surgery    HPI:  This is a 73 y.o. male who presents to the clinic today for left hip pain. Patient is here for MRI results    Patient with ongoing severe left lateral hip pain. Review of Systems   All other systems reviewed and are negative. Past History:    Current Outpatient Medications:     metoprolol succinate (TOPROL XL) 25 MG extended release tablet, Take 25 mg by mouth daily, Disp: , Rfl:     aspirin 81 MG tablet, Take 81 mg by mouth daily, Disp: , Rfl:     olmesartan-hydrochlorothiazide (BENICAR HCT) 40-25 MG per tablet, Take 1 tablet by mouth daily, Disp: , Rfl:     lisinopril (PRINIVIL;ZESTRIL) 20 MG tablet, Take 20 mg by mouth 2 times daily. , Disp: , Rfl:     glimepiride (AMARYL) 2 MG tablet, Take 2 mg by mouth every morning (before breakfast). , Disp: , Rfl:     atorvastatin (LIPITOR) 80 MG tablet, Take 80 mg by mouth daily. , Disp: , Rfl:     omeprazole (PRILOSEC) 20 MG capsule, Take 20 mg by mouth daily. , Disp: , Rfl:     metFORMIN (GLUCOPHAGE) 1000 MG tablet, Take 1,000 mg by mouth 2 times daily (with meals). , Disp: , Rfl:     carvedilol (COREG) 25 MG tablet, Take 12.5 mg by mouth 2 times daily (with meals) , Disp: , Rfl:   Allergies   Allergen Reactions    Penicillins      Social History     Socioeconomic History    Marital status:      Spouse name: Not on file    Number of children: Not on file    Years of education: Not on file    Highest education level: Not on file   Occupational History    Not on file   Tobacco Use    Smoking status: Never Smoker    Smokeless tobacco: Current User     Types: Chew   Vaping Use    Vaping Use: Never used   Substance and Sexual Activity    Alcohol use: Yes     Comment: rare    Drug use: Never    Sexual activity: Yes     Partners: Female   Other Topics Concern    Not on file   Social History Narrative    Not on file     Social Determinants of Health     Financial Resource Strain:     Difficulty of Paying Living Expenses: Not on file   Food Insecurity:     Worried About Running Out of Food in the Last Year: Not on file    Kenisha of Food in the Last Year: Not on file   Transportation Needs:     Lack of Transportation (Medical): Not on file    Lack of Transportation (Non-Medical):  Not on file   Physical Activity:     Days of Exercise per Week: Not on file    Minutes of Exercise per Session: Not on file   Stress:     Feeling of Stress : Not on file   Social Connections:     Frequency of Communication with Friends and Family: Not on file    Frequency of Social Gatherings with Friends and Family: Not on file    Attends Taoist Services: Not on file    Active Member of [address] [place] [address] or Organizations: Not on file    Attends Club or Organization Meetings: Not on file    Marital Status: Not on file   Intimate Partner Violence:     Fear of Current or Ex-Partner: Not on file    Emotionally Abused: Not on file    Physically Abused: Not on file    Sexually Abused: Not on file   Housing Stability:     Unable to Pay for Housing in the Last Year: Not on file    Number of [place] in the Last Year: Not on file    Unstable Housing in the Last Year: Not on file     Past Medical History:   Diagnosis Date    Hyperlipidemia     Hypertension     Osteoarthritis     Type II or unspecified type diabetes mellitus without mention of complication, not stated as uncontrolled      Past Surgical History:   Procedure Laterality Date    APPENDECTOMY      COLONOSCOPY N/A 5/30/2019    COLONOSCOPY POLYPECTOMY HOT BIOPSY performed by Wilhemina Olszewski, MD at [address] [place] [address],[facility], [id number] HAND SURGERY      HERNIA REPAIR      JOINT REPLACEMENT      total right hip Feb 2014  Dr. Onofre Linn       Family History   Problem Relation Age of Onset    Cancer Father     High Blood Pressure Father     Diabetes Father         Physical Exam:  Vitals signs and nursing note reviewed. Constitutional:       Appearance: well-developed. HENT:      Head: Normocephalic and atraumatic. Nose: Nose normal.   Eyes:      Conjunctiva/sclera: Conjunctivae normal.   Neck:      Musculoskeletal: Normal range of motion and neck supple. Pulmonary:      Effort: Pulmonary effort is normal. No respiratory distress. Musculoskeletal:      Comments: Normal gait     Skin:     General: Skin is warm and dry. Neurological:      Mental Status: Alert and oriented to person, place, and time. Sensory: No sensory deficit. Psychiatric:         Behavior: Behavior normal.         Thought Content: Thought content normal.        Provider Attestation:  Maribeth Collado, personally performed the services described in this documentation. All medical record entries made by the scribe were at my direction and in my presence. I have reviewed the chart and discharge instructions and agree that the records reflect my personal performance and is accurate and complete. Alexandra Lezama MD 11/18/21     Scribe Attestation:  By signing my name below, Jesseniarasheed Baker, attest that this documentation has been prepared under the direction and in the presence of Dr. Sommer Bustillos.  Electronically signed: Vesna Buckley, 11/18/21     Please note that this chart was generated using voice recognition Dragon dictation software. Although every effort was made to ensure the accuracy of this automated transcription, some errors in transcription may have occurred.

## 2021-12-02 ENCOUNTER — HOSPITAL ENCOUNTER (OUTPATIENT)
Age: 66
Discharge: HOME | End: 2021-12-02
Payer: COMMERCIAL

## 2021-12-02 ENCOUNTER — HOSPITAL ENCOUNTER (OUTPATIENT)
Dept: PREADMISSION TESTING | Age: 66
Discharge: HOME | End: 2021-12-06
Payer: COMMERCIAL

## 2021-12-02 LAB
ABO/RH: NORMAL
ABSOLUTE EOS #: 0.3 K/UL (ref 0–0.4)
ABSOLUTE EOS #: 0.3 K/UL (ref 0–0.4)
ABSOLUTE IMMATURE GRANULOCYTE: ABNORMAL K/UL (ref 0–0.3)
ABSOLUTE IMMATURE GRANULOCYTE: ABNORMAL K/UL (ref 0–0.3)
ABSOLUTE LYMPH #: 1.4 K/UL (ref 1–4.8)
ABSOLUTE LYMPH #: 1.4 K/UL (ref 1–4.8)
ABSOLUTE MONO #: 0.6 K/UL (ref 0.1–1.3)
ABSOLUTE MONO #: 0.6 K/UL (ref 0.1–1.3)
ALBUMIN SERPL-MCNC: 4.6 G/DL (ref 3.5–5.2)
ALBUMIN/GLOBULIN RATIO: ABNORMAL (ref 1–2.5)
ALP BLD-CCNC: 85 U/L (ref 40–129)
ALT SERPL-CCNC: 18 U/L (ref 5–41)
ANION GAP SERPL CALCULATED.3IONS-SCNC: 11 MMOL/L (ref 9–17)
ANION GAP SERPL CALCULATED.3IONS-SCNC: 11 MMOL/L (ref 9–17)
ANTIBODY SCREEN: NEGATIVE
ARM BAND NUMBER: NORMAL
AST SERPL-CCNC: 16 U/L
BASOPHILS # BLD: 1 % (ref 0–2)
BASOPHILS # BLD: 1 % (ref 0–2)
BASOPHILS ABSOLUTE: 0.1 K/UL (ref 0–0.2)
BASOPHILS ABSOLUTE: 0.1 K/UL (ref 0–0.2)
BILIRUB SERPL-MCNC: 0.47 MG/DL (ref 0.3–1.2)
BILIRUBIN URINE: NEGATIVE
BUN BLDV-MCNC: 26 MG/DL (ref 8–23)
BUN BLDV-MCNC: 26 MG/DL (ref 8–23)
BUN/CREAT BLD: ABNORMAL (ref 9–20)
BUN/CREAT BLD: ABNORMAL (ref 9–20)
CALCIUM SERPL-MCNC: 9.6 MG/DL (ref 8.6–10.4)
CALCIUM SERPL-MCNC: 9.6 MG/DL (ref 8.6–10.4)
CHLORIDE BLD-SCNC: 101 MMOL/L (ref 98–107)
CHLORIDE BLD-SCNC: 101 MMOL/L (ref 98–107)
CO2: 27 MMOL/L (ref 20–31)
CO2: 27 MMOL/L (ref 20–31)
COLOR: YELLOW
COMMENT UA: NORMAL
CREAT SERPL-MCNC: 1.18 MG/DL (ref 0.7–1.2)
CREAT SERPL-MCNC: 1.18 MG/DL (ref 0.7–1.2)
DIFFERENTIAL TYPE: ABNORMAL
DIFFERENTIAL TYPE: ABNORMAL
EOSINOPHILS RELATIVE PERCENT: 5 % (ref 0–4)
EOSINOPHILS RELATIVE PERCENT: 5 % (ref 0–4)
EXPIRATION DATE: NORMAL
GFR AFRICAN AMERICAN: >60 ML/MIN
GFR AFRICAN AMERICAN: >60 ML/MIN
GFR NON-AFRICAN AMERICAN: >60 ML/MIN
GFR NON-AFRICAN AMERICAN: >60 ML/MIN
GFR SERPL CREATININE-BSD FRML MDRD: ABNORMAL ML/MIN/{1.73_M2}
GLUCOSE BLD-MCNC: 119 MG/DL (ref 70–99)
GLUCOSE BLD-MCNC: 119 MG/DL (ref 70–99)
GLUCOSE URINE: NEGATIVE
HCT VFR BLD CALC: 40.6 % (ref 41–53)
HCT VFR BLD CALC: 40.6 % (ref 41–53)
HEMOGLOBIN: 13.7 G/DL (ref 13.5–17.5)
HEMOGLOBIN: 13.7 G/DL (ref 13.5–17.5)
IMMATURE GRANULOCYTES: ABNORMAL %
IMMATURE GRANULOCYTES: ABNORMAL %
KETONES, URINE: NEGATIVE
LEUKOCYTE ESTERASE, URINE: NEGATIVE
LYMPHOCYTES # BLD: 20 % (ref 24–44)
LYMPHOCYTES # BLD: 20 % (ref 24–44)
MCH RBC QN AUTO: 32 PG (ref 26–34)
MCH RBC QN AUTO: 32 PG (ref 26–34)
MCHC RBC AUTO-ENTMCNC: 33.8 G/DL (ref 31–37)
MCHC RBC AUTO-ENTMCNC: 33.8 G/DL (ref 31–37)
MCV RBC AUTO: 94.6 FL (ref 80–100)
MCV RBC AUTO: 94.6 FL (ref 80–100)
MONOCYTES # BLD: 9 % (ref 1–7)
MONOCYTES # BLD: 9 % (ref 1–7)
NITRITE, URINE: NEGATIVE
NRBC AUTOMATED: ABNORMAL PER 100 WBC
NRBC AUTOMATED: ABNORMAL PER 100 WBC
PDW BLD-RTO: 14.2 % (ref 11.5–14.9)
PDW BLD-RTO: 14.2 % (ref 11.5–14.9)
PH UA: 6.5 (ref 5–8)
PLATELET # BLD: 300 K/UL (ref 150–450)
PLATELET # BLD: 300 K/UL (ref 150–450)
PLATELET ESTIMATE: ABNORMAL
PLATELET ESTIMATE: ABNORMAL
PMV BLD AUTO: 7.8 FL (ref 6–12)
PMV BLD AUTO: 7.8 FL (ref 6–12)
POTASSIUM SERPL-SCNC: 4.5 MMOL/L (ref 3.7–5.3)
POTASSIUM SERPL-SCNC: 4.5 MMOL/L (ref 3.7–5.3)
PROSTATE SPECIFIC ANTIGEN: 0.57 UG/L
PROTEIN UA: NEGATIVE
RBC # BLD: 4.29 M/UL (ref 4.5–5.9)
RBC # BLD: 4.29 M/UL (ref 4.5–5.9)
RBC # BLD: ABNORMAL 10*6/UL
RBC # BLD: ABNORMAL 10*6/UL
SEG NEUTROPHILS: 65 % (ref 36–66)
SEG NEUTROPHILS: 65 % (ref 36–66)
SEGMENTED NEUTROPHILS ABSOLUTE COUNT: 4.6 K/UL (ref 1.3–9.1)
SEGMENTED NEUTROPHILS ABSOLUTE COUNT: 4.6 K/UL (ref 1.3–9.1)
SODIUM BLD-SCNC: 139 MMOL/L (ref 135–144)
SODIUM BLD-SCNC: 139 MMOL/L (ref 135–144)
SPECIFIC GRAVITY UA: 1.02 (ref 1–1.03)
THYROXINE, FREE: 1.3 NG/DL (ref 0.93–1.7)
TOTAL PROTEIN: 7 G/DL (ref 6.4–8.3)
TSH SERPL DL<=0.05 MIU/L-ACNC: 2.52 MIU/L (ref 0.3–5)
TURBIDITY: CLEAR
URINE HGB: NEGATIVE
UROBILINOGEN, URINE: NORMAL
VITAMIN D 25-HYDROXY: 48.3 NG/ML (ref 30–100)
WBC # BLD: 7 K/UL (ref 3.5–11)
WBC # BLD: 7 K/UL (ref 3.5–11)
WBC # BLD: ABNORMAL 10*3/UL
WBC # BLD: ABNORMAL 10*3/UL

## 2021-12-02 PROCEDURE — 80048 BASIC METABOLIC PNL TOTAL CA: CPT

## 2021-12-02 PROCEDURE — 85025 COMPLETE CBC W/AUTO DIFF WBC: CPT

## 2021-12-02 PROCEDURE — 36415 COLL VENOUS BLD VENIPUNCTURE: CPT

## 2021-12-02 PROCEDURE — 80053 COMPREHEN METABOLIC PANEL: CPT

## 2021-12-02 PROCEDURE — 87641 MR-STAPH DNA AMP PROBE: CPT

## 2021-12-02 PROCEDURE — 82306 VITAMIN D 25 HYDROXY: CPT

## 2021-12-02 PROCEDURE — 83036 HEMOGLOBIN GLYCOSYLATED A1C: CPT

## 2021-12-02 PROCEDURE — G0103 PSA SCREENING: HCPCS

## 2021-12-02 PROCEDURE — 86850 RBC ANTIBODY SCREEN: CPT

## 2021-12-02 PROCEDURE — 93005 ELECTROCARDIOGRAM TRACING: CPT | Performed by: ANESTHESIOLOGY

## 2021-12-02 PROCEDURE — 81003 URINALYSIS AUTO W/O SCOPE: CPT

## 2021-12-02 PROCEDURE — 86901 BLOOD TYPING SEROLOGIC RH(D): CPT

## 2021-12-02 PROCEDURE — 86900 BLOOD TYPING SEROLOGIC ABO: CPT

## 2021-12-02 PROCEDURE — 84439 ASSAY OF FREE THYROXINE: CPT

## 2021-12-02 PROCEDURE — 84443 ASSAY THYROID STIM HORMONE: CPT

## 2021-12-02 RX ORDER — MULTIVIT WITH MINERALS/LUTEIN
1000 TABLET ORAL DAILY
COMMUNITY

## 2021-12-02 RX ORDER — DULAGLUTIDE 1.5 MG/.5ML
1.5 INJECTION, SOLUTION SUBCUTANEOUS WEEKLY
COMMUNITY

## 2021-12-02 RX ORDER — M-VIT,TX,IRON,MINS/CALC/FOLIC 27MG-0.4MG
1 TABLET ORAL DAILY
COMMUNITY

## 2021-12-02 ASSESSMENT — ENCOUNTER SYMPTOMS
BLOOD IN STOOL: 0
SINUS PRESSURE: 0
DIARRHEA: 0
RHINORRHEA: 0
RESPIRATORY NEGATIVE: 1
ABDOMINAL DISTENTION: 0
ABDOMINAL PAIN: 0
NAUSEA: 0
VOMITING: 0
GASTROINTESTINAL NEGATIVE: 1
TROUBLE SWALLOWING: 0
SORE THROAT: 0

## 2021-12-02 NOTE — H&P (VIEW-ONLY)
Right     joint replacement.  HERNIA REPAIR      JOINT REPLACEMENT      total right hip Feb 2014  Dr. Lucien Rinne Left     bone shaved, and impingement.  TONSILLECTOMY         SOCIAL HISTORY       Social History     Socioeconomic History    Marital status:      Spouse name: None    Number of children: None    Years of education: None    Highest education level: None   Occupational History    None   Tobacco Use    Smoking status: Never Smoker    Smokeless tobacco: Current User     Types: Chew    Tobacco comment: chew on weekends   Vaping Use    Vaping Use: Never used   Substance and Sexual Activity    Alcohol use: Yes     Comment: rare    Drug use: Never    Sexual activity: Yes     Partners: Female   Other Topics Concern    None   Social History Narrative    None     Social Determinants of Health     Financial Resource Strain:     Difficulty of Paying Living Expenses: Not on file   Food Insecurity:     Worried About Running Out of Food in the Last Year: Not on file    Kenisha of Food in the Last Year: Not on file   Transportation Needs:     Lack of Transportation (Medical): Not on file    Lack of Transportation (Non-Medical):  Not on file   Physical Activity:     Days of Exercise per Week: Not on file    Minutes of Exercise per Session: Not on file   Stress:     Feeling of Stress : Not on file   Social Connections:     Frequency of Communication with Friends and Family: Not on file    Frequency of Social Gatherings with Friends and Family: Not on file    Attends Lutheran Services: Not on file    Active Member of Clubs or Organizations: Not on file    Attends Club or Organization Meetings: Not on file    Marital Status: Not on file   Intimate Partner Violence:     Fear of Current or Ex-Partner: Not on file    Emotionally Abused: Not on file    Physically Abused: Not on file    Sexually Abused: Not on file   Housing Stability:     Unable to Pay for Housing in the Last Year: Not on file    Number of Places Lived in the Last Year: Not on file    Unstable Housing in the Last Year: Not on file       REVIEW OF SYSTEMS      Allergies   Allergen Reactions    Penicillins        Current Outpatient Medications on File Prior to Encounter   Medication Sig Dispense Refill    Dulaglutide (TRULICITY) 1.5 IW/9.7ZT SOPN Inject 1.5 mg into the skin once a week Sunday's      Multiple Vitamins-Minerals (THERAPEUTIC MULTIVITAMIN-MINERALS) tablet Take 1 tablet by mouth daily      Ascorbic Acid (VITAMIN C) 250 MG tablet Take 1,000 mg by mouth daily      Misc Natural Products (GLUCOSAMINE CHOND CMP ADVANCED PO) Take 2 tablets by mouth daily takes (Glucosamine Chondroitin w/Vit D & MSM)1500mg/160 mg/ 50mg/115mg      NONFORMULARY Take 2 tablets by mouth daily Vitamin B 1,000 MCG daily      aspirin 81 MG tablet Take 81 mg by mouth daily      olmesartan-hydrochlorothiazide (BENICAR HCT) 40-25 MG per tablet Take 1 tablet by mouth daily      atorvastatin (LIPITOR) 80 MG tablet Take 80 mg by mouth daily.  omeprazole (PRILOSEC) 20 MG capsule Take 20 mg by mouth daily.  metFORMIN (GLUCOPHAGE) 1000 MG tablet Take 1,000 mg by mouth 2 times daily (with meals).  carvedilol (COREG) 25 MG tablet Take 12.5 mg by mouth 2 times daily (with meals)        No current facility-administered medications on file prior to encounter. Review of Systems   Constitutional: Negative. Negative for fatigue and fever. HENT: Negative for dental problem, ear pain, postnasal drip, rhinorrhea, sinus pressure, sore throat and trouble swallowing. Eyes: Positive for visual disturbance. Glasses   Respiratory: Negative. Cardiovascular: Negative. Negative for chest pain, palpitations and leg swelling. Gastrointestinal: Negative. Negative for abdominal distention, abdominal pain, blood in stool, diarrhea, nausea and vomiting. Endocrine: Negative. Genitourinary: Negative.   Negative for dysuria, flank pain, frequency and hematuria. Musculoskeletal:        HIP PAIN LEFT   Skin: Negative. Negative for rash and wound. Neurological: Negative. Negative for dizziness, light-headedness, numbness and headaches. Hematological: Negative. Psychiatric/Behavioral: Negative. Negative for agitation, confusion, self-injury and sleep disturbance. The patient is not nervous/anxious. GENERAL PHYSICAL EXAM     Vitals: There were no vitals taken for this visit. Physical Exam  Vitals reviewed. Constitutional:       General: He is not in acute distress. Appearance: Normal appearance. He is normal weight. He is not ill-appearing or toxic-appearing. HENT:      Head: Normocephalic and atraumatic. Right Ear: External ear normal.      Left Ear: External ear normal.      Nose: Nose normal. No congestion or rhinorrhea. Mouth/Throat:      Mouth: Mucous membranes are moist.      Pharynx: Oropharynx is clear. No oropharyngeal exudate or posterior oropharyngeal erythema. Eyes:      General:         Right eye: No discharge. Left eye: No discharge. Extraocular Movements: Extraocular movements intact. Conjunctiva/sclera: Conjunctivae normal.      Pupils: Pupils are equal, round, and reactive to light. Comments: +glasses   Cardiovascular:      Rate and Rhythm: Normal rate and regular rhythm. Pulses: Normal pulses. Heart sounds: Normal heart sounds. No murmur heard. No friction rub. No gallop. Pulmonary:      Effort: Pulmonary effort is normal. No respiratory distress. Breath sounds: Normal breath sounds. No wheezing, rhonchi or rales. Chest:      Chest wall: No tenderness. Abdominal:      General: Abdomen is flat. Bowel sounds are normal. There is no distension. Palpations: Abdomen is soft. There is no mass. Tenderness: There is no abdominal tenderness. There is no guarding. Genitourinary:     Comments: Deferred. Musculoskeletal:         General: No swelling or tenderness. Normal range of motion. Cervical back: Normal range of motion and neck supple. No rigidity or tenderness. Right lower leg: No edema. Left lower leg: No edema. Skin:     General: Skin is warm and dry. Capillary Refill: Capillary refill takes less than 2 seconds. Coloration: Skin is not jaundiced. Findings: No bruising, erythema, lesion or rash. Comments: Facial flushing   Neurological:      General: No focal deficit present. Mental Status: He is alert and oriented to person, place, and time. Mental status is at baseline. Sensory: No sensory deficit. Motor: No weakness. Gait: Gait abnormal.   Psychiatric:         Mood and Affect: Mood normal.         Behavior: Behavior normal.         Thought Content:  Thought content normal.         Judgment: Judgment normal.         LAB REVIEW       SURGERY / PROVISIONAL DIAGNOSES:      DJD LEFT HIP   HIP TOTAL ARTHROPLASTY Left    Patient Active Problem List    Diagnosis Date Noted    History of right hip replacement 12/06/2016    Status post right hip replacement 02/27/2014    Arthritis, hip 01/21/2014    Knee pain 12/17/2013    Hip pain 12/17/2013           Clearance requested per anesthesiologist: LATANYA Kearney CNP on 12/2/2021 at 1:02 PM

## 2021-12-02 NOTE — H&P
HISTORY and [facility] [address]       NAME:  Guerline Arreguin  MRN: [id number]   YOB: 1955   Date: 12/2/2021   Age: 73 y.o. Gender: male     COMPLAINT AND PRESENT HISTORY:   Guerline Arreguin is 73 y.o.,  male, presents for pre-anesthesia/admission testing for HIP TOTAL ARTHROPLASTY Left per . Pt states he has been experiencing pain in his left hip since may of this year, he states the pain has been progressively worsening. He denies any falls or trauma. ROM is not affected. Pain is rated 8/10 in severity, aching/stabbing in nature. He states there is always some pain but being on his feet for long periods of time make it worse. There are no alleviating factors. Pt has hx of HLD, HTN, DM2. Pt does not wear dentures. Pt denies any hx of MRSA infection  Pt does take ASA, educated on stopping prior to surgery   Pt denies any personal or FHx of complications with anesthesia. Pt denies any acute symptoms of illness at this time including no SOB, CP, fever, URI or UTI symptoms. Testing completed r/t condition:  MRI HIP LEFT WO CONTRAST  FINDINGS:   BONE MARROW:  Subcortical cystic changes at the lateral margin of the left   acetabulum.       Probable degenerative marrow edema along the inferior right SI joint on image   12, series 4.       Marrow edema at the superior left humeral head favored to be degenerative   rather than AVN.     Moderate degenerative changes within the lumbar spine.  Visualized sacral   ala, iliac wings, pubic rami otherwise demonstrate normal bone marrow signal   intensity.       HIP JOINT: Right hip arthroplasty hardware is present with associated   susceptibility artifact that limits the examination.       Severe left hip chondromalacia.  Small left hip joint effusion.    Mild-to-moderate osteophyte spurring of the left hip joint space.       Left femoral head properly located within the left acetabulum without clear   evidence for acute fracture, dislocation or femoral head flattening.       LABRUM: Degenerative tearing of the left acetabular labrum.       BURSAE: Trace fluid in the left greater trochanteric bursa.  No significant   fluid in the right greater trochanteric or bilateral iliopsoas bursa.       SCIATIC NERVE: Proximal sciatic nerves demonstrate normal course, contour,   and caliber on limited coronal T1 weighted imaging.       MUSCLES / TENDONS: Low-grade partial tearing at the insertion of the left   gluteus medius tendon. Visualized muscles/tendons otherwise appear grossly   intact without evidence of tearing.       INTRAPELVIC CONTENTS / SOFT TISSUES: Limited images of the intrapelvic   contents demonstrate no acute abnormality.           Impression   1. Moderate left hip osteoarthrosis with associated chondromalacia.  Small   left hip joint effusion.  Probable reactive marrow edema that is degenerative   at the superior left femoral head rather than overt left femoral head AVN. 2. Probable degenerative marrow edema along the inferior right SI joint. 3. Right hip arthroplasty hardware with associated susceptibility artifact   that limits the exam.   4. Degenerative tearing of the left acetabular labrum. 5. Low-grade partial tearing at the left gluteus medius tendinous insertion   with trace left greater trochanteric bursal fluid       PAST MEDICAL HISTORY     Past Medical History:   Diagnosis Date    Hyperlipidemia     Hypertension     Osteoarthritis     Type II or unspecified type diabetes mellitus without mention of complication, not stated as uncontrolled     Wears glasses        SURGICAL HISTORY       Past Surgical History:   Procedure Laterality Date    APPENDECTOMY      CARDIAC SURGERY  06/06/2021    heart ablation     COLONOSCOPY N/A 5/30/2019    COLONOSCOPY POLYPECTOMY HOT BIOPSY performed by Geoff Correa MD at [address] [place] [address]      orbital wall fracture.     HAND SURGERY Right     joint replacement.  HERNIA REPAIR      JOINT REPLACEMENT      total right hip Feb 2014  Dr. David Salcedo Left     bone shaved, and impingement.  TONSILLECTOMY         SOCIAL HISTORY       Social History     Socioeconomic History    Marital status:      Spouse name: None    Number of children: None    Years of education: None    Highest education level: None   Occupational History    None   Tobacco Use    Smoking status: Never Smoker    Smokeless tobacco: Current User     Types: Chew    Tobacco comment: chew on weekends   Vaping Use    Vaping Use: Never used   Substance and Sexual Activity    Alcohol use: Yes     Comment: rare    Drug use: Never    Sexual activity: Yes     Partners: Female   Other Topics Concern    None   Social History Narrative    None     Social Determinants of Health     Financial Resource Strain:     Difficulty of Paying Living Expenses: Not on file   Food Insecurity:     Worried About Running Out of Food in the Last Year: Not on file    Kenisha of Food in the Last Year: Not on file   Transportation Needs:     Lack of Transportation (Medical): Not on file    Lack of Transportation (Non-Medical):  Not on file   Physical Activity:     Days of Exercise per Week: Not on file    Minutes of Exercise per Session: Not on file   Stress:     Feeling of Stress : Not on file   Social Connections:     Frequency of Communication with Friends and Family: Not on file    Frequency of Social Gatherings with Friends and Family: Not on file    Attends Gnosticist Services: Not on file    Active Member of Clubs or Organizations: Not on file    Attends Club or Organization Meetings: Not on file    Marital Status: Not on file   Intimate Partner Violence:     Fear of Current or Ex-Partner: Not on file    Emotionally Abused: Not on file    Physically Abused: Not on file    Sexually Abused: Not on file   Housing Stability:     Unable to Pay for Housing in the Last Year: Not on file    Number of Places Lived in the Last Year: Not on file    Unstable Housing in the Last Year: Not on file       REVIEW OF SYSTEMS      Allergies   Allergen Reactions    Penicillins        Current Outpatient Medications on File Prior to Encounter   Medication Sig Dispense Refill    Dulaglutide (TRULICITY) 1.5 PG/1.7EC SOPN Inject 1.5 mg into the skin once a week Sunday's      Multiple Vitamins-Minerals (THERAPEUTIC MULTIVITAMIN-MINERALS) tablet Take 1 tablet by mouth daily      Ascorbic Acid (VITAMIN C) 250 MG tablet Take 1,000 mg by mouth daily      Misc Natural Products (GLUCOSAMINE CHOND CMP ADVANCED PO) Take 2 tablets by mouth daily takes (Glucosamine Chondroitin w/Vit D & MSM)1500mg/160 mg/ 50mg/115mg      NONFORMULARY Take 2 tablets by mouth daily Vitamin B 1,000 MCG daily      aspirin 81 MG tablet Take 81 mg by mouth daily      olmesartan-hydrochlorothiazide (BENICAR HCT) 40-25 MG per tablet Take 1 tablet by mouth daily      atorvastatin (LIPITOR) 80 MG tablet Take 80 mg by mouth daily.  omeprazole (PRILOSEC) 20 MG capsule Take 20 mg by mouth daily.  metFORMIN (GLUCOPHAGE) 1000 MG tablet Take 1,000 mg by mouth 2 times daily (with meals).  carvedilol (COREG) 25 MG tablet Take 12.5 mg by mouth 2 times daily (with meals)        No current facility-administered medications on file prior to encounter. Review of Systems   Constitutional: Negative. Negative for fatigue and fever. HENT: Negative for dental problem, ear pain, postnasal drip, rhinorrhea, sinus pressure, sore throat and trouble swallowing. Eyes: Positive for visual disturbance. Glasses   Respiratory: Negative. Cardiovascular: Negative. Negative for chest pain, palpitations and leg swelling. Gastrointestinal: Negative. Negative for abdominal distention, abdominal pain, blood in stool, diarrhea, nausea and vomiting. Endocrine: Negative. Genitourinary: Negative.   Negative for dysuria, flank pain, frequency and hematuria. Musculoskeletal:        HIP PAIN LEFT   Skin: Negative. Negative for rash and wound. Neurological: Negative. Negative for dizziness, light-headedness, numbness and headaches. Hematological: Negative. Psychiatric/Behavioral: Negative. Negative for agitation, confusion, self-injury and sleep disturbance. The patient is not nervous/anxious. GENERAL PHYSICAL EXAM     Vitals: There were no vitals taken for this visit. Physical Exam  Vitals reviewed. Constitutional:       General: He is not in acute distress. Appearance: Normal appearance. He is normal weight. He is not ill-appearing or toxic-appearing. HENT:      Head: Normocephalic and atraumatic. Right Ear: External ear normal.      Left Ear: External ear normal.      Nose: Nose normal. No congestion or rhinorrhea. Mouth/Throat:      Mouth: Mucous membranes are moist.      Pharynx: Oropharynx is clear. No oropharyngeal exudate or posterior oropharyngeal erythema. Eyes:      General:         Right eye: No discharge. Left eye: No discharge. Extraocular Movements: Extraocular movements intact. Conjunctiva/sclera: Conjunctivae normal.      Pupils: Pupils are equal, round, and reactive to light. Comments: +glasses   Cardiovascular:      Rate and Rhythm: Normal rate and regular rhythm. Pulses: Normal pulses. Heart sounds: Normal heart sounds. No murmur heard. No friction rub. No gallop. Pulmonary:      Effort: Pulmonary effort is normal. No respiratory distress. Breath sounds: Normal breath sounds. No wheezing, rhonchi or rales. Chest:      Chest wall: No tenderness. Abdominal:      General: Abdomen is flat. Bowel sounds are normal. There is no distension. Palpations: Abdomen is soft. There is no mass. Tenderness: There is no abdominal tenderness. There is no guarding. Genitourinary:     Comments: Deferred. Musculoskeletal:         General: No swelling or tenderness. Normal range of motion. Cervical back: Normal range of motion and neck supple. No rigidity or tenderness. Right lower leg: No edema. Left lower leg: No edema. Skin:     General: Skin is warm and dry. Capillary Refill: Capillary refill takes less than 2 seconds. Coloration: Skin is not jaundiced. Findings: No bruising, erythema, lesion or rash. Comments: Facial flushing   Neurological:      General: No focal deficit present. Mental Status: He is alert and oriented to person, place, and time. Mental status is at baseline. Sensory: No sensory deficit. Motor: No weakness. Gait: Gait abnormal.   Psychiatric:         Mood and Affect: Mood normal.         Behavior: Behavior normal.         Thought Content:  Thought content normal.         Judgment: Judgment normal.         LAB REVIEW       SURGERY / PROVISIONAL DIAGNOSES:      DJD LEFT HIP   HIP TOTAL ARTHROPLASTY Left    Patient Active Problem List    Diagnosis Date Noted    History of right hip replacement 12/06/2016    Status post right hip replacement 02/27/2014    Arthritis, hip 01/21/2014    Knee pain 12/17/2013    Hip pain 12/17/2013           Clearance requested per anesthesiologist: LATANYA Mueller - CNP on 12/2/2021 at 1:02 PM

## 2021-12-02 NOTE — PROGRESS NOTES
Dr. Ben Morales, anesthesia, was contacted and informed of patient's history and planned surgery. Medical clearance requested. Surgery scheduling will notify Dr. Moshe Baum office who will be responsible for making sure the clearance is obtained and is in the chart for surgery.

## 2021-12-03 LAB
EKG ATRIAL RATE: 76 BPM
EKG P AXIS: 46 DEGREES
EKG P-R INTERVAL: 188 MS
EKG Q-T INTERVAL: 386 MS
EKG QRS DURATION: 78 MS
EKG QTC CALCULATION (BAZETT): 434 MS
EKG R AXIS: -25 DEGREES
EKG T AXIS: 22 DEGREES
EKG VENTRICULAR RATE: 76 BPM
ESTIMATED AVERAGE GLUCOSE: 160 MG/DL
HBA1C MFR BLD: 7.2 % (ref 4–6)
MRSA, DNA, NASAL: NORMAL
SPECIMEN DESCRIPTION: NORMAL

## 2021-12-03 PROCEDURE — 93010 ELECTROCARDIOGRAM REPORT: CPT | Performed by: INTERNAL MEDICINE

## 2021-12-13 ENCOUNTER — TELEPHONE (OUTPATIENT)
Dept: ORTHOPEDIC SURGERY | Age: 66
End: 2021-12-13

## 2021-12-14 ENCOUNTER — ANESTHESIA EVENT (OUTPATIENT)
Dept: OPERATING ROOM | Age: 66
DRG: 470 | End: 2021-12-14
Payer: COMMERCIAL

## 2021-12-15 ENCOUNTER — HOSPITAL ENCOUNTER (INPATIENT)
Age: 66
LOS: 1 days | Discharge: HOME | DRG: 470 | End: 2021-12-16
Attending: ORTHOPAEDIC SURGERY | Admitting: ORTHOPAEDIC SURGERY
Payer: COMMERCIAL

## 2021-12-15 ENCOUNTER — ANESTHESIA (OUTPATIENT)
Dept: OPERATING ROOM | Age: 66
DRG: 470 | End: 2021-12-15
Payer: COMMERCIAL

## 2021-12-15 ENCOUNTER — APPOINTMENT (OUTPATIENT)
Dept: GENERAL RADIOLOGY | Age: 66
DRG: 470 | End: 2021-12-15
Attending: ORTHOPAEDIC SURGERY
Payer: COMMERCIAL

## 2021-12-15 VITALS — OXYGEN SATURATION: 100 % | SYSTOLIC BLOOD PRESSURE: 151 MMHG | TEMPERATURE: 97.2 F | DIASTOLIC BLOOD PRESSURE: 96 MMHG

## 2021-12-15 DIAGNOSIS — M16.10 ARTHRITIS, HIP: Primary | ICD-10-CM

## 2021-12-15 DIAGNOSIS — M25.559 HIP PAIN: ICD-10-CM

## 2021-12-15 DIAGNOSIS — M19.90 ARTHRITIS: ICD-10-CM

## 2021-12-15 PROBLEM — R00.2 INTERMITTENT PALPITATIONS: Status: ACTIVE | Noted: 2021-04-23

## 2021-12-15 PROBLEM — R07.89 CHEST TIGHTNESS: Status: ACTIVE | Noted: 2021-04-23

## 2021-12-15 PROBLEM — J12.82 PNEUMONIA DUE TO COVID-19 VIRUS: Status: ACTIVE | Noted: 2020-11-15

## 2021-12-15 PROBLEM — R55 NEAR SYNCOPE: Status: ACTIVE | Noted: 2020-11-14

## 2021-12-15 PROBLEM — Z86.16 HISTORY OF COVID-19: Status: ACTIVE | Noted: 2021-04-23

## 2021-12-15 PROBLEM — N18.30 CHRONIC KIDNEY DISEASE, STAGE 3 (HCC): Status: ACTIVE | Noted: 2021-04-23

## 2021-12-15 PROBLEM — R94.31 ABNORMAL EKG: Status: ACTIVE | Noted: 2021-04-23

## 2021-12-15 PROBLEM — U07.1 PNEUMONIA DUE TO COVID-19 VIRUS: Status: ACTIVE | Noted: 2020-11-15

## 2021-12-15 PROBLEM — I51.7 LVH (LEFT VENTRICULAR HYPERTROPHY): Status: ACTIVE | Noted: 2021-05-27

## 2021-12-15 LAB
GLUCOSE BLD-MCNC: 114 MG/DL (ref 75–110)
GLUCOSE BLD-MCNC: 158 MG/DL (ref 75–110)
GLUCOSE BLD-MCNC: 159 MG/DL (ref 75–110)

## 2021-12-15 PROCEDURE — 0SRB01Z REPLACEMENT OF LEFT HIP JOINT WITH METAL SYNTHETIC SUBSTITUTE, OPEN APPROACH: ICD-10-PCS | Performed by: ORTHOPAEDIC SURGERY

## 2021-12-15 PROCEDURE — 2580000003 HC RX 258: Performed by: ANESTHESIOLOGY

## 2021-12-15 PROCEDURE — 6360000002 HC RX W HCPCS: Performed by: ORTHOPAEDIC SURGERY

## 2021-12-15 PROCEDURE — 1200000000 HC SEMI PRIVATE

## 2021-12-15 PROCEDURE — 3700000000 HC ANESTHESIA ATTENDED CARE: Performed by: ORTHOPAEDIC SURGERY

## 2021-12-15 PROCEDURE — 2709999900 HC NON-CHARGEABLE SUPPLY: Performed by: ORTHOPAEDIC SURGERY

## 2021-12-15 PROCEDURE — 73501 X-RAY EXAM HIP UNI 1 VIEW: CPT

## 2021-12-15 PROCEDURE — 6360000002 HC RX W HCPCS

## 2021-12-15 PROCEDURE — 7100000001 HC PACU RECOVERY - ADDTL 15 MIN: Performed by: ORTHOPAEDIC SURGERY

## 2021-12-15 PROCEDURE — 6370000000 HC RX 637 (ALT 250 FOR IP): Performed by: ORTHOPAEDIC SURGERY

## 2021-12-15 PROCEDURE — 7100000000 HC PACU RECOVERY - FIRST 15 MIN: Performed by: ORTHOPAEDIC SURGERY

## 2021-12-15 PROCEDURE — 3600000003 HC SURGERY LEVEL 3 BASE: Performed by: ORTHOPAEDIC SURGERY

## 2021-12-15 PROCEDURE — 82947 ASSAY GLUCOSE BLOOD QUANT: CPT

## 2021-12-15 PROCEDURE — C1776 JOINT DEVICE (IMPLANTABLE): HCPCS | Performed by: ORTHOPAEDIC SURGERY

## 2021-12-15 PROCEDURE — 2500000003 HC RX 250 WO HCPCS

## 2021-12-15 PROCEDURE — 6360000002 HC RX W HCPCS: Performed by: ANESTHESIOLOGY

## 2021-12-15 PROCEDURE — 3700000001 HC ADD 15 MINUTES (ANESTHESIA): Performed by: ORTHOPAEDIC SURGERY

## 2021-12-15 PROCEDURE — 3600000013 HC SURGERY LEVEL 3 ADDTL 15MIN: Performed by: ORTHOPAEDIC SURGERY

## 2021-12-15 PROCEDURE — 2500000003 HC RX 250 WO HCPCS: Performed by: ORTHOPAEDIC SURGERY

## 2021-12-15 DEVICE — IMPLANTABLE DEVICE: Type: IMPLANTABLE DEVICE | Site: HIP | Status: FUNCTIONAL

## 2021-12-15 DEVICE — G7 FINNED 4 HOLE SHELL 54F: Type: IMPLANTABLE DEVICE | Site: HIP | Status: FUNCTIONAL

## 2021-12-15 DEVICE — STEM FEM SZ 15 L150MM 133DEG DST HIP PPS STD OFFSET TYP 1: Type: IMPLANTABLE DEVICE | Site: HIP | Status: FUNCTIONAL

## 2021-12-15 DEVICE — HEAD FEM DIA36MM +0MM STD OFFSET CO CHROM HIP TYP 1 TAPR: Type: IMPLANTABLE DEVICE | Site: HIP | Status: FUNCTIONAL

## 2021-12-15 RX ORDER — HYDRALAZINE HYDROCHLORIDE 20 MG/ML
5 INJECTION INTRAMUSCULAR; INTRAVENOUS EVERY 10 MIN PRN
Status: DISCONTINUED | OUTPATIENT
Start: 2021-12-15 | End: 2021-12-15 | Stop reason: HOSPADM

## 2021-12-15 RX ORDER — SODIUM CHLORIDE 0.9 % (FLUSH) 0.9 %
5-40 SYRINGE (ML) INJECTION PRN
Status: DISCONTINUED | OUTPATIENT
Start: 2021-12-15 | End: 2021-12-15 | Stop reason: HOSPADM

## 2021-12-15 RX ORDER — FENTANYL CITRATE 50 UG/ML
INJECTION, SOLUTION INTRAMUSCULAR; INTRAVENOUS PRN
Status: DISCONTINUED | OUTPATIENT
Start: 2021-12-15 | End: 2021-12-15 | Stop reason: SDUPTHER

## 2021-12-15 RX ORDER — 0.9 % SODIUM CHLORIDE 0.9 %
500 INTRAVENOUS SOLUTION INTRAVENOUS
Status: DISCONTINUED | OUTPATIENT
Start: 2021-12-15 | End: 2021-12-15 | Stop reason: HOSPADM

## 2021-12-15 RX ORDER — LIDOCAINE HYDROCHLORIDE 20 MG/ML
INJECTION, SOLUTION EPIDURAL; INFILTRATION; INTRACAUDAL; PERINEURAL PRN
Status: DISCONTINUED | OUTPATIENT
Start: 2021-12-15 | End: 2021-12-15 | Stop reason: SDUPTHER

## 2021-12-15 RX ORDER — SODIUM CHLORIDE 9 MG/ML
25 INJECTION, SOLUTION INTRAVENOUS PRN
Status: DISCONTINUED | OUTPATIENT
Start: 2021-12-15 | End: 2021-12-16 | Stop reason: HOSPADM

## 2021-12-15 RX ORDER — OXYCODONE HYDROCHLORIDE 10 MG/1
10 TABLET ORAL EVERY 4 HOURS PRN
Status: DISCONTINUED | OUTPATIENT
Start: 2021-12-15 | End: 2021-12-16 | Stop reason: HOSPADM

## 2021-12-15 RX ORDER — OXYCODONE HYDROCHLORIDE 5 MG/1
5 TABLET ORAL EVERY 4 HOURS PRN
Status: DISCONTINUED | OUTPATIENT
Start: 2021-12-15 | End: 2021-12-16 | Stop reason: HOSPADM

## 2021-12-15 RX ORDER — SODIUM CHLORIDE 9 MG/ML
25 INJECTION, SOLUTION INTRAVENOUS PRN
Status: DISCONTINUED | OUTPATIENT
Start: 2021-12-15 | End: 2021-12-15 | Stop reason: HOSPADM

## 2021-12-15 RX ORDER — TRANEXAMIC ACID 100 MG/ML
INJECTION, SOLUTION INTRAVENOUS PRN
Status: DISCONTINUED | OUTPATIENT
Start: 2021-12-15 | End: 2021-12-15 | Stop reason: SDUPTHER

## 2021-12-15 RX ORDER — PROMETHAZINE HYDROCHLORIDE 12.5 MG/1
12.5 TABLET ORAL EVERY 6 HOURS PRN
Status: DISCONTINUED | OUTPATIENT
Start: 2021-12-15 | End: 2021-12-16 | Stop reason: HOSPADM

## 2021-12-15 RX ORDER — SODIUM CHLORIDE 0.9 % (FLUSH) 0.9 %
5-40 SYRINGE (ML) INJECTION EVERY 12 HOURS SCHEDULED
Status: DISCONTINUED | OUTPATIENT
Start: 2021-12-15 | End: 2021-12-16 | Stop reason: HOSPADM

## 2021-12-15 RX ORDER — METOCLOPRAMIDE HYDROCHLORIDE 5 MG/ML
INJECTION INTRAMUSCULAR; INTRAVENOUS PRN
Status: DISCONTINUED | OUTPATIENT
Start: 2021-12-15 | End: 2021-12-15 | Stop reason: SDUPTHER

## 2021-12-15 RX ORDER — HYDROMORPHONE HCL 110MG/55ML
PATIENT CONTROLLED ANALGESIA SYRINGE INTRAVENOUS PRN
Status: DISCONTINUED | OUTPATIENT
Start: 2021-12-15 | End: 2021-12-15 | Stop reason: SDUPTHER

## 2021-12-15 RX ORDER — PANTOPRAZOLE SODIUM 40 MG/1
40 TABLET, DELAYED RELEASE ORAL
Status: DISCONTINUED | OUTPATIENT
Start: 2021-12-16 | End: 2021-12-16 | Stop reason: HOSPADM

## 2021-12-15 RX ORDER — PROPOFOL 10 MG/ML
INJECTION, EMULSION INTRAVENOUS PRN
Status: DISCONTINUED | OUTPATIENT
Start: 2021-12-15 | End: 2021-12-15 | Stop reason: SDUPTHER

## 2021-12-15 RX ORDER — ONDANSETRON 2 MG/ML
INJECTION INTRAMUSCULAR; INTRAVENOUS PRN
Status: DISCONTINUED | OUTPATIENT
Start: 2021-12-15 | End: 2021-12-15 | Stop reason: SDUPTHER

## 2021-12-15 RX ORDER — ASPIRIN 81 MG/1
81 TABLET ORAL 2 TIMES DAILY
Qty: 90 TABLET | Refills: 0 | Status: SHIPPED | OUTPATIENT
Start: 2021-12-15

## 2021-12-15 RX ORDER — ATORVASTATIN CALCIUM 80 MG/1
80 TABLET, FILM COATED ORAL DAILY
Status: DISCONTINUED | OUTPATIENT
Start: 2021-12-15 | End: 2021-12-16 | Stop reason: HOSPADM

## 2021-12-15 RX ORDER — MORPHINE SULFATE 2 MG/ML
2 INJECTION, SOLUTION INTRAMUSCULAR; INTRAVENOUS
Status: DISCONTINUED | OUTPATIENT
Start: 2021-12-15 | End: 2021-12-16 | Stop reason: HOSPADM

## 2021-12-15 RX ORDER — FENTANYL CITRATE 50 UG/ML
25 INJECTION, SOLUTION INTRAMUSCULAR; INTRAVENOUS EVERY 5 MIN PRN
Status: DISCONTINUED | OUTPATIENT
Start: 2021-12-15 | End: 2021-12-15 | Stop reason: HOSPADM

## 2021-12-15 RX ORDER — HYDROCHLOROTHIAZIDE 25 MG/1
25 TABLET ORAL DAILY
Status: DISCONTINUED | OUTPATIENT
Start: 2021-12-15 | End: 2021-12-16 | Stop reason: HOSPADM

## 2021-12-15 RX ORDER — ASPIRIN 81 MG/1
81 TABLET ORAL 2 TIMES DAILY
Status: DISCONTINUED | OUTPATIENT
Start: 2021-12-15 | End: 2021-12-16 | Stop reason: HOSPADM

## 2021-12-15 RX ORDER — SODIUM CHLORIDE 9 MG/ML
INJECTION, SOLUTION INTRAVENOUS CONTINUOUS
Status: DISCONTINUED | OUTPATIENT
Start: 2021-12-15 | End: 2021-12-15

## 2021-12-15 RX ORDER — LIDOCAINE HYDROCHLORIDE 10 MG/ML
1 INJECTION, SOLUTION EPIDURAL; INFILTRATION; INTRACAUDAL; PERINEURAL
Status: DISCONTINUED | OUTPATIENT
Start: 2021-12-15 | End: 2021-12-15 | Stop reason: HOSPADM

## 2021-12-15 RX ORDER — LABETALOL HYDROCHLORIDE 5 MG/ML
5 INJECTION, SOLUTION INTRAVENOUS EVERY 10 MIN PRN
Status: DISCONTINUED | OUTPATIENT
Start: 2021-12-15 | End: 2021-12-15 | Stop reason: HOSPADM

## 2021-12-15 RX ORDER — ONDANSETRON 2 MG/ML
4 INJECTION INTRAMUSCULAR; INTRAVENOUS EVERY 6 HOURS PRN
Status: DISCONTINUED | OUTPATIENT
Start: 2021-12-15 | End: 2021-12-16 | Stop reason: HOSPADM

## 2021-12-15 RX ORDER — PROMETHAZINE HYDROCHLORIDE 25 MG/ML
6.25 INJECTION, SOLUTION INTRAMUSCULAR; INTRAVENOUS
Status: DISCONTINUED | OUTPATIENT
Start: 2021-12-15 | End: 2021-12-15 | Stop reason: HOSPADM

## 2021-12-15 RX ORDER — METOCLOPRAMIDE HYDROCHLORIDE 5 MG/ML
10 INJECTION INTRAMUSCULAR; INTRAVENOUS
Status: DISCONTINUED | OUTPATIENT
Start: 2021-12-15 | End: 2021-12-15 | Stop reason: HOSPADM

## 2021-12-15 RX ORDER — ROCURONIUM BROMIDE 10 MG/ML
INJECTION, SOLUTION INTRAVENOUS PRN
Status: DISCONTINUED | OUTPATIENT
Start: 2021-12-15 | End: 2021-12-15 | Stop reason: SDUPTHER

## 2021-12-15 RX ORDER — SODIUM CHLORIDE 0.9 % (FLUSH) 0.9 %
5-40 SYRINGE (ML) INJECTION PRN
Status: DISCONTINUED | OUTPATIENT
Start: 2021-12-15 | End: 2021-12-16 | Stop reason: HOSPADM

## 2021-12-15 RX ORDER — DEXAMETHASONE SODIUM PHOSPHATE 4 MG/ML
INJECTION, SOLUTION INTRA-ARTICULAR; INTRALESIONAL; INTRAMUSCULAR; INTRAVENOUS; SOFT TISSUE PRN
Status: DISCONTINUED | OUTPATIENT
Start: 2021-12-15 | End: 2021-12-15 | Stop reason: SDUPTHER

## 2021-12-15 RX ORDER — OLMESARTAN MEDOXOMIL AND HYDROCHLOROTHIAZIDE 40/25 40; 25 MG/1; MG/1
1 TABLET ORAL DAILY
Status: DISCONTINUED | OUTPATIENT
Start: 2021-12-15 | End: 2021-12-15

## 2021-12-15 RX ORDER — ACETAMINOPHEN 325 MG/1
650 TABLET ORAL EVERY 6 HOURS
Status: DISCONTINUED | OUTPATIENT
Start: 2021-12-15 | End: 2021-12-16 | Stop reason: HOSPADM

## 2021-12-15 RX ORDER — SODIUM CHLORIDE 0.9 % (FLUSH) 0.9 %
5-40 SYRINGE (ML) INJECTION EVERY 12 HOURS SCHEDULED
Status: DISCONTINUED | OUTPATIENT
Start: 2021-12-15 | End: 2021-12-15 | Stop reason: HOSPADM

## 2021-12-15 RX ORDER — LOSARTAN POTASSIUM 50 MG/1
100 TABLET ORAL DAILY
Status: DISCONTINUED | OUTPATIENT
Start: 2021-12-15 | End: 2021-12-16 | Stop reason: HOSPADM

## 2021-12-15 RX ORDER — OXYCODONE HYDROCHLORIDE AND ACETAMINOPHEN 5; 325 MG/1; MG/1
1 TABLET ORAL
Status: DISCONTINUED | OUTPATIENT
Start: 2021-12-15 | End: 2021-12-15 | Stop reason: HOSPADM

## 2021-12-15 RX ORDER — MEPERIDINE HYDROCHLORIDE 25 MG/ML
12.5 INJECTION INTRAMUSCULAR; INTRAVENOUS; SUBCUTANEOUS EVERY 5 MIN PRN
Status: DISCONTINUED | OUTPATIENT
Start: 2021-12-15 | End: 2021-12-15 | Stop reason: HOSPADM

## 2021-12-15 RX ORDER — MORPHINE SULFATE 4 MG/ML
4 INJECTION, SOLUTION INTRAMUSCULAR; INTRAVENOUS
Status: DISCONTINUED | OUTPATIENT
Start: 2021-12-15 | End: 2021-12-16 | Stop reason: HOSPADM

## 2021-12-15 RX ORDER — CARVEDILOL 12.5 MG/1
12.5 TABLET ORAL 2 TIMES DAILY WITH MEALS
Status: DISCONTINUED | OUTPATIENT
Start: 2021-12-15 | End: 2021-12-16 | Stop reason: HOSPADM

## 2021-12-15 RX ORDER — DIPHENHYDRAMINE HYDROCHLORIDE 50 MG/ML
12.5 INJECTION INTRAMUSCULAR; INTRAVENOUS
Status: DISCONTINUED | OUTPATIENT
Start: 2021-12-15 | End: 2021-12-15 | Stop reason: HOSPADM

## 2021-12-15 RX ORDER — OXYCODONE HYDROCHLORIDE AND ACETAMINOPHEN 5; 325 MG/1; MG/1
1 TABLET ORAL EVERY 6 HOURS PRN
Qty: 28 TABLET | Refills: 0 | Status: SHIPPED | OUTPATIENT
Start: 2021-12-15 | End: 2021-12-22

## 2021-12-15 RX ADMIN — HYDROMORPHONE HYDROCHLORIDE 0.5 MG: 1 INJECTION, SOLUTION INTRAMUSCULAR; INTRAVENOUS; SUBCUTANEOUS at 15:55

## 2021-12-15 RX ADMIN — HYDROMORPHONE HYDROCHLORIDE 0.2 MG: 2 INJECTION, SOLUTION INTRAMUSCULAR; INTRAVENOUS; SUBCUTANEOUS at 14:40

## 2021-12-15 RX ADMIN — ACETAMINOPHEN 650 MG: 325 TABLET, FILM COATED ORAL at 22:43

## 2021-12-15 RX ADMIN — LOSARTAN POTASSIUM 100 MG: 50 TABLET, FILM COATED ORAL at 17:43

## 2021-12-15 RX ADMIN — HYDROMORPHONE HYDROCHLORIDE 0.2 MG: 2 INJECTION, SOLUTION INTRAMUSCULAR; INTRAVENOUS; SUBCUTANEOUS at 15:31

## 2021-12-15 RX ADMIN — ASPIRIN 81 MG: 81 TABLET, COATED ORAL at 22:43

## 2021-12-15 RX ADMIN — SODIUM CHLORIDE: 9 INJECTION, SOLUTION INTRAVENOUS at 13:00

## 2021-12-15 RX ADMIN — TRANEXAMIC ACID 1000 MG: 100 INJECTION INTRAVENOUS at 14:23

## 2021-12-15 RX ADMIN — DEXAMETHASONE SODIUM PHOSPHATE 4 MG: 4 INJECTION, SOLUTION INTRAMUSCULAR; INTRAVENOUS at 14:12

## 2021-12-15 RX ADMIN — CEFAZOLIN 2000 MG: 10 INJECTION, POWDER, FOR SOLUTION INTRAVENOUS at 17:47

## 2021-12-15 RX ADMIN — CARVEDILOL 12.5 MG: 12.5 TABLET, FILM COATED ORAL at 17:43

## 2021-12-15 RX ADMIN — HYDROMORPHONE HYDROCHLORIDE 0.4 MG: 2 INJECTION, SOLUTION INTRAMUSCULAR; INTRAVENOUS; SUBCUTANEOUS at 14:35

## 2021-12-15 RX ADMIN — ONDANSETRON 4 MG: 2 INJECTION INTRAMUSCULAR; INTRAVENOUS at 15:09

## 2021-12-15 RX ADMIN — ROCURONIUM BROMIDE 40 MG: 10 INJECTION, SOLUTION INTRAVENOUS at 14:00

## 2021-12-15 RX ADMIN — FENTANYL CITRATE 25 MCG: 50 INJECTION, SOLUTION INTRAMUSCULAR; INTRAVENOUS at 15:20

## 2021-12-15 RX ADMIN — CEFAZOLIN 2000 MG: 10 INJECTION, POWDER, FOR SOLUTION INTRAVENOUS at 14:10

## 2021-12-15 RX ADMIN — HYDROMORPHONE HYDROCHLORIDE 0.2 MG: 2 INJECTION, SOLUTION INTRAMUSCULAR; INTRAVENOUS; SUBCUTANEOUS at 15:23

## 2021-12-15 RX ADMIN — ATORVASTATIN CALCIUM 80 MG: 80 TABLET, FILM COATED ORAL at 17:43

## 2021-12-15 RX ADMIN — HYDROMORPHONE HYDROCHLORIDE 0.5 MG: 1 INJECTION, SOLUTION INTRAMUSCULAR; INTRAVENOUS; SUBCUTANEOUS at 15:45

## 2021-12-15 RX ADMIN — METOCLOPRAMIDE 10 MG: 5 INJECTION, SOLUTION INTRAMUSCULAR; INTRAVENOUS at 14:33

## 2021-12-15 RX ADMIN — HYDROMORPHONE HYDROCHLORIDE 0.2 MG: 2 INJECTION, SOLUTION INTRAMUSCULAR; INTRAVENOUS; SUBCUTANEOUS at 15:28

## 2021-12-15 RX ADMIN — PROPOFOL 200 MG: 10 INJECTION, EMULSION INTRAVENOUS at 13:59

## 2021-12-15 RX ADMIN — METFORMIN HYDROCHLORIDE 1000 MG: 1000 TABLET, FILM COATED ORAL at 17:43

## 2021-12-15 RX ADMIN — SUGAMMADEX 200 MG: 100 INJECTION, SOLUTION INTRAVENOUS at 15:16

## 2021-12-15 RX ADMIN — FENTANYL CITRATE 50 MCG: 50 INJECTION, SOLUTION INTRAMUSCULAR; INTRAVENOUS at 13:59

## 2021-12-15 RX ADMIN — LIDOCAINE HYDROCHLORIDE 80 MG: 20 INJECTION, SOLUTION EPIDURAL; INFILTRATION; INTRACAUDAL; PERINEURAL at 13:59

## 2021-12-15 RX ADMIN — PROPOFOL 50 MG: 10 INJECTION, EMULSION INTRAVENOUS at 14:03

## 2021-12-15 RX ADMIN — HYDROCHLOROTHIAZIDE 25 MG: 25 TABLET ORAL at 17:43

## 2021-12-15 RX ADMIN — ACETAMINOPHEN 650 MG: 325 TABLET, FILM COATED ORAL at 17:43

## 2021-12-15 ASSESSMENT — PULMONARY FUNCTION TESTS
PIF_VALUE: 38
PIF_VALUE: 16
PIF_VALUE: 17
PIF_VALUE: 15
PIF_VALUE: 17
PIF_VALUE: 15
PIF_VALUE: 17
PIF_VALUE: 35
PIF_VALUE: 16
PIF_VALUE: 17
PIF_VALUE: 16
PIF_VALUE: 25
PIF_VALUE: 5
PIF_VALUE: 16
PIF_VALUE: 32
PIF_VALUE: 16
PIF_VALUE: 16
PIF_VALUE: 17
PIF_VALUE: 3
PIF_VALUE: 17
PIF_VALUE: 1
PIF_VALUE: -16
PIF_VALUE: 17
PIF_VALUE: 18
PIF_VALUE: 17
PIF_VALUE: 11
PIF_VALUE: 17
PIF_VALUE: 16
PIF_VALUE: 17
PIF_VALUE: 18
PIF_VALUE: 16
PIF_VALUE: 25
PIF_VALUE: 20
PIF_VALUE: 17
PIF_VALUE: 17
PIF_VALUE: 0
PIF_VALUE: 17
PIF_VALUE: 1
PIF_VALUE: 17
PIF_VALUE: 17
PIF_VALUE: 4
PIF_VALUE: 11
PIF_VALUE: 16
PIF_VALUE: 17
PIF_VALUE: 10
PIF_VALUE: 4
PIF_VALUE: 17
PIF_VALUE: 16
PIF_VALUE: 1
PIF_VALUE: 15
PIF_VALUE: 17
PIF_VALUE: 5
PIF_VALUE: 18
PIF_VALUE: 18
PIF_VALUE: 10
PIF_VALUE: 18
PIF_VALUE: 18
PIF_VALUE: 12
PIF_VALUE: 16
PIF_VALUE: 17
PIF_VALUE: 17
PIF_VALUE: 10
PIF_VALUE: 17
PIF_VALUE: 18
PIF_VALUE: 10
PIF_VALUE: 17
PIF_VALUE: 18
PIF_VALUE: 17
PIF_VALUE: 3
PIF_VALUE: 16
PIF_VALUE: 17
PIF_VALUE: 16
PIF_VALUE: 17
PIF_VALUE: 18
PIF_VALUE: 19
PIF_VALUE: 19
PIF_VALUE: 4
PIF_VALUE: 1
PIF_VALUE: 17
PIF_VALUE: 1
PIF_VALUE: 16

## 2021-12-15 ASSESSMENT — PAIN DESCRIPTION - ONSET
ONSET: AWAKENED FROM SLEEP

## 2021-12-15 ASSESSMENT — PAIN SCALES - GENERAL
PAINLEVEL_OUTOF10: 8
PAINLEVEL_OUTOF10: 5
PAINLEVEL_OUTOF10: 7
PAINLEVEL_OUTOF10: 8
PAINLEVEL_OUTOF10: 7
PAINLEVEL_OUTOF10: 8

## 2021-12-15 ASSESSMENT — PAIN DESCRIPTION - DESCRIPTORS
DESCRIPTORS: SORE

## 2021-12-15 ASSESSMENT — PAIN DESCRIPTION - PAIN TYPE
TYPE: SURGICAL PAIN

## 2021-12-15 ASSESSMENT — PAIN DESCRIPTION - ORIENTATION
ORIENTATION: LEFT

## 2021-12-15 ASSESSMENT — PAIN DESCRIPTION - LOCATION
LOCATION: HIP

## 2021-12-15 ASSESSMENT — PAIN DESCRIPTION - PROGRESSION
CLINICAL_PROGRESSION: NOT CHANGED
CLINICAL_PROGRESSION: GRADUALLY IMPROVING
CLINICAL_PROGRESSION: NOT CHANGED

## 2021-12-15 ASSESSMENT — PAIN - FUNCTIONAL ASSESSMENT: PAIN_FUNCTIONAL_ASSESSMENT: 0-10

## 2021-12-15 NOTE — INTERVAL H&P NOTE
Update History & Physical     The patient's History and Physical of 12-2-21 was reviewed with the patient. 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 Nubia Vasquez- states his heart rhythm has felt normal since. Denies current chest pain, palpitations, SOB, dizziness, leg swelling. States mild headache currently, but possibly r/t no coffee this morning- not worst h/a ever, denies vision changes. Did not check BS this morning- has [place]ar glucose sensor, which has been removed. Denies hypoglycemic s/s. Physical exam completely unchanged from source note except facial flushing not appreciated, tenderness is noted to left hip, lateral aspect, no erythema/warmth, no significant swelling. NPO status: Patient states they have been NPO since before midnight. Medications taken TODAY (w/sip of water): Coreg, omeprazole. Anticoagulation status: Held ASA since 2 weeks, last took IBU 2 days ago, denies any other recent anti-inflammatory use. Personal or family hx of complications w/anesthesia: Patient states that w/umbilical hernia, had a bad headache after coming out of anesthesia- but he does feel this was r/t lack of sleep and not r/t anesthesia, has had anesthesia since this time w/no problems. Denies personal hx of MRSA. Denies personal hx of blood clots. Denies CP, palpitations, dizziness, SOB, URI s/s, GI issues, fever/chills. Review current vital signs per RN flow sheet.   (Notation: Medications listed are not currently reconciled at the signing of this H&P note, to be reconciled in pre-op per RN)    Most recent lab work reviewed:  Lab Results   Component Value Date     12/02/2021     12/02/2021    K 4.5 12/02/2021    K 4.5 12/02/2021     12/02/2021     12/02/2021    CO2 27 12/02/2021    CO2 27 12/02/2021    BUN 26 (H) 12/02/2021    BUN 26 (H) 12/02/2021    CREATININE 1.18 12/02/2021    CREATININE 1.18 12/02/2021    GLUCOSE 119 (H) 12/02/2021    GLUCOSE 119 (H) 12/02/2021    CALCIUM 9.6 12/02/2021    CALCIUM 9.6 12/02/2021    PROT 7.0 12/02/2021    LABALBU 4.6 12/02/2021    BILITOT 0.47 12/02/2021    ALKPHOS 85 12/02/2021    AST 16 12/02/2021    ALT 18 12/02/2021    LABGLOM >60 12/02/2021    LABGLOM >60 12/02/2021    GFRAA >60 12/02/2021    GFRAA >60 12/02/2021    GLOB NOT REPORTED 02/24/2014       Lab Results   Component Value Date    WBC 7.0 12/02/2021    WBC 7.0 12/02/2021    HGB 13.7 12/02/2021    HGB 13.7 12/02/2021    HCT 40.6 (L) 12/02/2021    HCT 40.6 (L) 12/02/2021    MCV 94.6 12/02/2021    MCV 94.6 12/02/2021     12/02/2021     12/02/2021     ECHO, 5-10-21:  Narrative      Normal left ventricular size and systolic function with moderate   concentric left ventricular hypertrophy. No grossly abnormal stenotic or regurgitant flows are seen. Left Ventricle   Left ventricle appears normal in size. There is moderate increased wall thickness/hypertrophy. Systolic function is normal with an ejection fraction of 55-60%. See wall score diagram for wall motion abnormalities. Lateral E' is 7.80 cm/s. Medial E' is 5.85 cm/s. Right Ventricle   Right ventricular size appears normal. Systolic function is normal.     Left Atrium   Left atrium is normal in size. The left atrial volume index is 23.2 mL/m2. Right Atrium   Right atrium is normal in size. Mitral Valve   The leaflets are mildly thickened. There is trace regurgitation. There is no evidence of mitral valve stenosis. Tricuspid Valve   Tricuspid valve appears to be normal. There is trace regurgitation. There is no evidence of tricuspid valve stenosis. RVSP estimated at <30 mmHg. Aortic Valve   The aortic valve is trileaflet. There is no regurgitation or stenosis.      Pulmonic Valve   Pulmonic valve structure is grossly normal. There is no regurgitation or stenosis. Ascending Aorta   The aortic root is normal in size. Pericardium   There is no pericardial effusion. Study Details   A complete echo was performed using complete 2D. Wall Scoring Baseline   Score Index: 1.00   The left ventricular wall motion is normal.    Past Medical History:   Diagnosis Date    Abnormal EKG 4/23/2021    Arthritis of left hip     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) ([address]     Chest tightness 04/23/2021    Chronic kidney disease, stage 3 ([address] 04/23/2021    History of COVID-19 04/23/2021    Hyperlipidemia     Hypertension     Intermittent palpitations 04/23/2021    Left hip pain     LVH (left ventricular hypertrophy) 05/27/2021    Near syncope 11/14/2020    Osteoarthritis     Pneumonia due to COVID-19 virus 11/15/2020    Type II or unspecified type diabetes mellitus without mention of complication, not stated as uncontrolled     Wears glasses      Past Surgical History:   Procedure Laterality Date    ABLATION OF DYSRHYTHMIC FOCUS  06/04/2021    AVNRT Ablation- CARTO; Surgeon: Arturo Aguilar MD; Location: [facility] (); Service: Cardiology; Laterality: N/A;    APPENDECTOMY      CATARACT REMOVAL WITH IMPLANT Right     COLONOSCOPY N/A 05/30/2019    COLONOSCOPY POLYPECTOMY HOT BIOPSY performed by Kael Silva MD at [address] Left     orbital wall fracture. HAND SURGERY Right     joint replacement. INGUINAL HERNIA REPAIR Bilateral     JOINT REPLACEMENT      total right hip Feb 2014  Dr. Yadi Van Left     bone shaved, and impingement. TONSILLECTOMY AND ADENOIDECTOMY      UMBILICAL HERNIA REPAIR        Surgical site was confirmed per myself and the patient.   Electronically signed by LATANYA Huerta CNP on 12/15/2021 at 12:38 PM

## 2021-12-15 NOTE — ANESTHESIA PRE PROCEDURE
 sodium chloride flush 0.9 % injection 5-40 mL  5-40 mL IntraVENous 2 times per day Concetta Kruse MD        sodium chloride flush 0.9 % injection 5-40 mL  5-40 mL IntraVENous PRN Concetta Kruse MD        0.9 % sodium chloride infusion  25 mL IntraVENous PRN Concetta Kruse MD        lidocaine PF 1 % injection 1 mL  1 mL IntraDERmal Once PRN Concetta Kruse MD           Allergies: Allergies   Allergen Reactions    Penicillins        Problem List:    Patient Active Problem List   Diagnosis Code    Knee pain M25.569    Hip pain M25.559    Arthritis, hip M16.10    Status post right hip replacement Z96.641    History of right hip replacement Z96.641    Abnormal EKG R94.31    Chest tightness R07.89    Chronic kidney disease, stage 3 (HCC) N18.30    History of COVID-19 Z86.16    Intermittent palpitations R00.2    LVH (left ventricular hypertrophy) I51.7    Near syncope R55    Pneumonia due to COVID-19 virus U07.1, J12.82       Past Medical History:        Diagnosis Date    Abnormal EKG 4/23/2021    Arthritis of left hip     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) [facility]     Chest tightness 04/23/2021    Chronic kidney disease, stage 3 ([address] 04/23/2021    History of COVID-19 04/23/2021    Hyperlipidemia     Hypertension     Intermittent palpitations 04/23/2021    Left hip pain     LVH (left ventricular hypertrophy) 05/27/2021    Near syncope 11/14/2020    Osteoarthritis     Pneumonia due to COVID-19 virus 11/15/2020    Type II or unspecified type diabetes mellitus without mention of complication, not stated as uncontrolled     Wears glasses        Past Surgical History:        Procedure Laterality Date    ABLATION OF DYSRHYTHMIC FOCUS  06/04/2021    AVNRT Ablation- CARTO; Surgeon: Tere Cowan MD; Location: [facility] (); Service: Cardiology;  Laterality: N/A;    APPENDECTOMY      CATARACT REMOVAL WITH IMPLANT Right     COLONOSCOPY N/A 05/30/2019    COLONOSCOPY POLYPECTOMY HOT BIOPSY performed by Abel Lambert MD at [address] [facility] [address] Left     orbital wall fracture.  HAND SURGERY Right     joint replacement.  INGUINAL HERNIA REPAIR Bilateral     JOINT REPLACEMENT      total right hip Feb 2014  Dr. Nirmal Hagen Left     bone shaved, and impingement.  TONSILLECTOMY AND ADENOIDECTOMY      UMBILICAL HERNIA REPAIR         Social History:    Social History     Tobacco Use    Smoking status: Never Smoker    Smokeless tobacco: Current User     Types: Chew    Tobacco comment: chew on weekends   Substance Use Topics    Alcohol use: Yes     Comment: rare                                Ready to quit: Not Answered  Counseling given: Not Answered  Comment: chew on weekends      Vital Signs (Current):   Vitals:    12/15/21 1229   BP: (!) 148/83   Pulse: 79   Resp: 16   Temp: 98.2 °F (36.8 °C)   TempSrc: Infrared   SpO2: 98%   Weight: 207 lb (93.9 kg)   Height: 5' 9\" (1.753 m)                                              BP Readings from Last 3 Encounters:   12/15/21 (!) 148/83   07/23/19 132/64   05/30/19 (!) 122/59       NPO Status: Time of last liquid consumption: 1630                        Time of last solid consumption: 1630                        Date of last liquid consumption: 12/14/21                        Date of last solid food consumption: 12/14/21    BMI:   Wt Readings from Last 3 Encounters:   12/15/21 207 lb (93.9 kg)   11/18/21 207 lb (93.9 kg)   11/04/21 207 lb (93.9 kg)     Body mass index is 30.57 kg/m².     CBC:   Lab Results   Component Value Date    WBC 7.0 12/02/2021    WBC 7.0 12/02/2021    RBC 4.29 12/02/2021    RBC 4.29 12/02/2021    HGB 13.7 12/02/2021    HGB 13.7 12/02/2021    HCT 40.6 12/02/2021    HCT 40.6 12/02/2021    MCV 94.6 12/02/2021    MCV 94.6 12/02/2021    RDW 14.2 12/02/2021    RDW 14.2 12/02/2021     12/02/2021     12/02/2021       CMP:   Lab Results   Component Value Date     12/02/2021     12/02/2021    K 4.5 12/02/2021    K 4.5 12/02/2021     12/02/2021     12/02/2021    CO2 27 12/02/2021    CO2 27 12/02/2021    BUN 26 12/02/2021    BUN 26 12/02/2021    CREATININE 1.18 12/02/2021    CREATININE 1.18 12/02/2021    GFRAA >60 12/02/2021    GFRAA >60 12/02/2021    LABGLOM >60 12/02/2021    LABGLOM >60 12/02/2021    GLUCOSE 119 12/02/2021    GLUCOSE 119 12/02/2021    PROT 7.0 12/02/2021    CALCIUM 9.6 12/02/2021    CALCIUM 9.6 12/02/2021    BILITOT 0.47 12/02/2021    ALKPHOS 85 12/02/2021    AST 16 12/02/2021    ALT 18 12/02/2021       POC Tests:   Recent Labs     12/15/21  1307   POCGLU 114*       Coags:   Lab Results   Component Value Date    PROTIME 13.2 02/24/2014    INR 1.3 02/24/2014       HCG (If Applicable): No results found for: PREGTESTUR, PREGSERUM, HCG, HCGQUANT     ABGs: No results found for: PHART, PO2ART, SCK8VWD, TDR1UXY, BEART, N6KYIKIK     Type & Screen (If Applicable):  No results found for: LABABO, LABRH    Drug/Infectious Status (If Applicable):  No results found for: HIV, HEPCAB    COVID-19 Screening (If Applicable):   Lab Results   Component Value Date    COVID19 Not Detected 01/21/2021           Anesthesia Evaluation  Patient summary reviewed and Nursing notes reviewed no history of anesthetic complications:   Airway: Mallampati: I  TM distance: >3 FB   Neck ROM: full  Mouth opening: > = 3 FB Dental: normal exam         Pulmonary:Negative Pulmonary ROS and normal exam  breath sounds clear to auscultation                             Cardiovascular:    (+) hypertension:, hyperlipidemia        Rhythm: regular  Rate: normal                    Neuro/Psych:   Negative Neuro/Psych ROS              GI/Hepatic/Renal:   (+) GERD:,           Endo/Other:    (+) DiabetesType II DM, , .                 Abdominal:             Vascular: Other Findings:             Anesthesia Plan      general     ASA 2       Induction: intravenous.     MIPS: Postoperative opioids intended and Prophylactic antiemetics administered. Anesthetic plan and risks discussed with patient. Plan discussed with CRNA.                   Sadia Gu MD   12/15/2021

## 2021-12-15 NOTE — ANESTHESIA POSTPROCEDURE EVALUATION
Department of Anesthesiology  Postprocedure Note    Patient: Shonda Mak  MRN: [id number]  YOB: 1955  Date of evaluation: 12/15/2021  Time:  5:01 PM     Procedure Summary     Date: 12/15/21 Room / Location: [address] [place] [address]  / [address]    Anesthesia Start: 0184 Anesthesia Stop: 7958    Procedure: HIP TOTAL ARTHROPLASTY (Left Hip) Diagnosis: (DJD LEFT HIP (PT HAS HAD COVID VACCINE))    Surgeons: Ramin Silva MD Responsible Provider: Casie Rodríguez MD    Anesthesia Type: general ASA Status: 2          Anesthesia Type: general    Karen Phase I: Karen Score: 9    Karen Phase II:      Last vitals: Reviewed and per EMR flowsheets.        Anesthesia Post Evaluation    Comments: POST- ANESTHESIA EVALUATION       Pt Name: Shonda Mak  MRN: [id number]  YOB: 1955  Date of evaluation: 12/15/2021  Time:  5:01 PM      BP (!) 149/84   Pulse 85   Temp 97.3 °F (36.3 °C)   Resp 22   Ht 5' 9\" (1.753 m)   Wt 207 lb (93.9 kg)   SpO2 94%   BMI 30.57 kg/m²      Consciousness Level  Awake  Cardiopulmonary Status  Stable  Pain Adequately Treated YES  Nausea / Vomiting  NO  Adequate Hydration  YES  Anesthesia Related Complications NONE      Electronically signed by Casie Rodríguez MD on 12/15/2021 at 5:01 PM

## 2021-12-16 VITALS
BODY MASS INDEX: 30.66 KG/M2 | TEMPERATURE: 98.6 F | SYSTOLIC BLOOD PRESSURE: 124 MMHG | RESPIRATION RATE: 16 BRPM | OXYGEN SATURATION: 96 % | DIASTOLIC BLOOD PRESSURE: 66 MMHG | WEIGHT: 207 LBS | HEART RATE: 95 BPM | HEIGHT: 69 IN

## 2021-12-16 LAB — GLUCOSE BLD-MCNC: 236 MG/DL (ref 75–110)

## 2021-12-16 PROCEDURE — 82947 ASSAY GLUCOSE BLOOD QUANT: CPT

## 2021-12-16 PROCEDURE — 97535 SELF CARE MNGMENT TRAINING: CPT

## 2021-12-16 PROCEDURE — 99024 POSTOP FOLLOW-UP VISIT: CPT | Performed by: ORTHOPAEDIC SURGERY

## 2021-12-16 PROCEDURE — 97530 THERAPEUTIC ACTIVITIES: CPT

## 2021-12-16 PROCEDURE — 97166 OT EVAL MOD COMPLEX 45 MIN: CPT

## 2021-12-16 PROCEDURE — 6360000002 HC RX W HCPCS: Performed by: ORTHOPAEDIC SURGERY

## 2021-12-16 PROCEDURE — 97161 PT EVAL LOW COMPLEX 20 MIN: CPT

## 2021-12-16 PROCEDURE — 6370000000 HC RX 637 (ALT 250 FOR IP): Performed by: ORTHOPAEDIC SURGERY

## 2021-12-16 PROCEDURE — 2580000003 HC RX 258: Performed by: ORTHOPAEDIC SURGERY

## 2021-12-16 RX ADMIN — METFORMIN HYDROCHLORIDE 1000 MG: 1000 TABLET, FILM COATED ORAL at 07:57

## 2021-12-16 RX ADMIN — Medication 10 ML: at 07:58

## 2021-12-16 RX ADMIN — LOSARTAN POTASSIUM 100 MG: 50 TABLET, FILM COATED ORAL at 07:57

## 2021-12-16 RX ADMIN — OXYCODONE HYDROCHLORIDE 10 MG: 10 TABLET ORAL at 05:10

## 2021-12-16 RX ADMIN — HYDROCHLOROTHIAZIDE 25 MG: 25 TABLET ORAL at 07:57

## 2021-12-16 RX ADMIN — ATORVASTATIN CALCIUM 80 MG: 80 TABLET, FILM COATED ORAL at 07:57

## 2021-12-16 RX ADMIN — OXYCODONE HYDROCHLORIDE 10 MG: 10 TABLET ORAL at 09:13

## 2021-12-16 RX ADMIN — PANTOPRAZOLE SODIUM 40 MG: 40 TABLET, DELAYED RELEASE ORAL at 05:10

## 2021-12-16 RX ADMIN — CEFAZOLIN 2000 MG: 10 INJECTION, POWDER, FOR SOLUTION INTRAVENOUS at 00:55

## 2021-12-16 RX ADMIN — ACETAMINOPHEN 650 MG: 325 TABLET, FILM COATED ORAL at 05:07

## 2021-12-16 RX ADMIN — ASPIRIN 81 MG: 81 TABLET, COATED ORAL at 07:57

## 2021-12-16 RX ADMIN — OXYCODONE HYDROCHLORIDE 10 MG: 10 TABLET ORAL at 13:09

## 2021-12-16 RX ADMIN — CARVEDILOL 12.5 MG: 12.5 TABLET, FILM COATED ORAL at 07:57

## 2021-12-16 ASSESSMENT — PAIN DESCRIPTION - FREQUENCY: FREQUENCY: CONTINUOUS

## 2021-12-16 ASSESSMENT — PAIN DESCRIPTION - LOCATION
LOCATION: HIP
LOCATION: HIP

## 2021-12-16 ASSESSMENT — PAIN DESCRIPTION - PAIN TYPE
TYPE: SURGICAL PAIN
TYPE: SURGICAL PAIN

## 2021-12-16 ASSESSMENT — PAIN SCALES - GENERAL
PAINLEVEL_OUTOF10: 7
PAINLEVEL_OUTOF10: 5
PAINLEVEL_OUTOF10: 6
PAINLEVEL_OUTOF10: 8
PAINLEVEL_OUTOF10: 6
PAINLEVEL_OUTOF10: 7

## 2021-12-16 ASSESSMENT — PAIN DESCRIPTION - ORIENTATION
ORIENTATION: LEFT
ORIENTATION: LEFT

## 2021-12-16 NOTE — CARE COORDINATION
Joint Replacement Discharge Planning Note:    Admission Date:  12/15/2021 Yovana Lancaster is a 73 y.o.  male    Admitted for : Hip pain [M25.559]    Met with:  Patient    PCP:  Josette Agarwal DO              Insurance:  [facility]    Current Residence/ Living Arrangements:  independently at home w/ Wife            Current Services PTA:  No    Is patient agreeable to [address]: No    Is patient agreeable to outpatient physical therapy:  Yes    Freedom of choice provided: Yes         [address] [employer]/Outpatient Therapy chosen:  [address] needed: No    Current home DME:  straight cane and walker    Pharmacy:  [facility] [address]    Does Patient want to use MEDS to BEDS?([facility] only) No    Transportation Provider:  Patient                       Discharge Plan:   Patient intends to discharge to Home    Patient does not need a wheeled walker. Anticipated discharge date 12/16/21 [employer] Pt. Lives in [place] home, w/ Wife & other family members. DME- Cane, walker. Denies VNS. Pt. Is POD #1, Lt Total Hip, Wants Outpt PT, at [facility]. PT/OT. Denies other needs. DC today.  Will Follow//[facility]      Readmission Risk              Risk of Unplanned Readmission:  7           Electronically signed by: William Peterson RN on 12/16/2021 at 8:55 AM

## 2021-12-16 NOTE — FLOWSHEET NOTE
12/16/21 1109   Encounter Summary   Services provided to: Patient   Referral/Consult From: Lola   Spiritual/Protestant   Type Spiritual support   Assessment Sleeping   Intervention Prayer   Outcome Did not respond

## 2021-12-16 NOTE — PROGRESS NOTES
[facility]: ANIYAH LORD   Occupational Therapy Evaluation  Date: 21  Patient Name: Guerline Arreguin       Room:   MRN: [id number]  Account: [de-identified]   : 1955  (73 y.o.) Gender: male     Discharge Recommendations:  Further Occupational Therapy is recommended upon facility discharge. Equipment Needed:  (TBD)    Referring Practitioner: Yung Anthony MD          Treatment Diagnosis: Impaired self care status  Past Medical History:  has a past medical history of Abnormal EKG, Arthritis of left hip, 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) ([facility] [address] [address] Chest tightness, Chronic kidney disease, stage 3 ([facility] [address] History of COVID-19, Hyperlipidemia, Hypertension, Intermittent palpitations, Left hip pain, LVH (left ventricular hypertrophy), Near syncope, Osteoarthritis, Pneumonia due to COVID-19 virus, Type II or unspecified type diabetes mellitus without mention of complication, not stated as uncontrolled, and Wears glasses. Past Surgical History:   has a past surgical history that includes Inguinal hernia repair (Bilateral); Appendectomy; Tonsillectomy and adenoidectomy; Hand surgery (Right); shoulder surgery (Left); joint replacement; Colonoscopy (N/A, 2019); eye surgery (Left); ablation of dysrhythmic focus (2021); Cataract removal with implant (Right); Umbilical hernia repair; and Total hip arthroplasty (Left, 12/15/2021).     Restrictions  Restrictions/Precautions: Weight Bearing, ROM Restrictions, Surgical Protocols, Fall Risk, Up as Tolerated  Implants present? : Metal implants (B MONICA)  Hip Precautions: Posterior hip precautions  Left Lower Extremity Weight Bearing: Weight Bearing As Tolerated (FWBAT)  Required Braces or Orthoses?: No     Vitals  Temp: 98.6 °F (37 °C)  Pulse: 95  Resp: 16  BP: 124/66  Height: 5' 9\" (175.3 cm)  Weight: 207 lb (93.9 kg)  BMI (Calculated): 30.6  Oxygen Therapy  SpO2: 96 %  Pulse Oximeter Device Mode: Continuous  Pulse Oximeter Device Location: Right, Hand  O2 Device: Nasal cannula  O2 Flow Rate (L/min): 2 L/min  Level of Consciousness: Alert (0)    Subjective  Subjective: Pt resting in bed upon arrival. Pt was pleasant and agreeable to OT/PT eval  Comments: OK per RN for OT/PT eval   Overall Orientation Status: Within Functional Limits  Vision  Vision: Impaired  Vision Exceptions: Wears glasses at all times  Hearing  Hearing: Within functional limits  Social/Functional History  Lives With: Spouse, Daughter, Family (Daughters  and son)  Type of Home: House  Home Layout: Multi-level, Bed/Bath upstairs (tri-level; 1/2 bath on main level down)  Home Access: Stairs to enter without rails  Entrance Stairs - Number of Steps: 1+1 KARMA; 5 steps up to bedroom/bathroom with R HR; 5 steps down to family room with HR R side  Bathroom Shower/Tub: Tub/Shower unit, Doors  Bathroom Toilet: Standard  Bathroom Equipment: Toilet raiser  Bathroom Accessibility: Walker accessible  Home Equipment: Cane, Rolling walker, Reacher (Looking to buy wedge for bed)  ADL Assistance: Independent  Homemaking Assistance: Independent (wife and daughter complete [de-identified])  Homemaking Responsibilities: Yes (wife and daughter complete most)  Ambulation Assistance: Independent  Transfer Assistance: Independent  Active : Yes  Mode of Transportation: Truck, SUV  Occupation: Full time employment  Type of occupation:  for [employer]  IADL Comments: Pt reports sleeping in flat  Additional Comments: Pt reports that he has the ability to work from home. Pt reports that he has supportive family that can assist as needed.    Pain Assessment  Pain Assessment: 0-10  Pain Level: 6  Pain Type: Surgical pain  Pain Location: Hip  Pain Orientation: Left    Objective          Sensation  Overall Sensation Status: WFL (Pt denies)   ADL  Feeding: Independent  Grooming: Stand by assistance (Standing sink side for hand hygiene)  UE Bathing: Stand by assistance  LE Bathing: Minimal assistance (long handle sponge provided)  UE Dressing: Stand by assistance  LE Dressing: Moderate assistance  Toileting: Contact guard assistance  Additional Comments: ADL scores based on clinical reasoning and skilled observation unless otherwise noted. OT educated patient on adaptive equipment and adapative strategies to complete self care tasks while maintaining hip precautions. Pt educated on use of reacher, sock aide, and long handle sponge to complete lower body self care tasks while maintaining hip preacutions. Pt verbalized understanding. Pt educated on adaptive dressing technique donning surgical side first. Pt currently limited due to decreased balance, activity tolerance, pain, and hip precautions impacting safety and independence with self care tasks. UE Function           LUE Strength  Gross LUE Strength: WFL  L Hand General: 5/5     LUE Tone: Normotonic     LUE AROM (degrees)  LUE AROM : WFL     Left Hand AROM (degrees)  Left Hand AROM: WFL  RUE Strength  Gross RUE Strength: WFL  R Hand General: 5/5      RUE Tone: Normotonic     RUE AROM (degrees)  RUE AROM : WFL     Right Hand AROM (degrees)  Right Hand AROM: WFL    Fine Motor Skills  Coordination  Movements Are Fluid And Coordinated: Yes                           Mobility  Supine to Sit: Minimal assistance  Sit to Supine: Minimal assistance       Balance  Sitting Balance: Stand by assistance  Standing Balance: Contact guard assistance (CGA-SBA)  Standing Balance  Time: 1-2 minutes x 2; 3-4 x 2  Activity: functional mobility, toileting  Comment: with RW. Functional Mobility  Functional - Mobility Device: Rolling Walker  Activity: To/from bathroom, Other (hallways)  Assist Level: Contact guard assistance  Functional Mobility Comments: Verbal cues for hand placement, safety, and hip precautions. Pt demosntrated ability to progress to SBA with funcitonal mobility. Pt required sitting rest break due to fatigue.    Bed mobility  Supine to Sit: Minimal assistance  Sit to Supine: Minimal assistance  Scooting: Stand by assistance     Transfers  Sit to stand: Contact guard assistance  Stand to sit: Contact guard assistance  Transfer Comments: CGA-SBA   Functional Activity Tolerance  Functional Activity Tolerance: Tolerates 30 min exercise with multiple rests     Assessment  Assessment  Performance deficits / Impairments: Decreased ADL status, Decreased functional mobility , Decreased endurance, Decreased balance, Decreased high-level IADLs  Treatment Diagnosis: Impaired self care status  Prognosis: Good  Decision Making: Medium Complexity  REQUIRES OT FOLLOW UP: Yes  Activity Tolerance: Patient Tolerated treatment well, Patient limited by fatigue         Functional Outcome Measures  AM-PAC Daily Activity Inpatient   How much help for putting on and taking off regular lower body clothing?: A Lot  How much help for Bathing?: A Little  How much help for Toileting?: A Little  How much help for putting on and taking off regular upper body clothing?: A Little  How much help for taking care of personal grooming?: A Little  How much help for eating meals?: None  [facility] Inpatient Daily Activity Raw Score: 18  AM-PAC Inpatient ADL T-Scale Score : 38.66  ADL Inpatient CMS 0-100% Score: 46.65  ADL Inpatient CMS G-Code Modifier : CK       Goals  Patient Goals   Patient goals : To go home  Short term goals  Time Frame for Short term goals: By discharge  Short term goal 1: Pt will complete lower body dressing/bathing with modified independence and good safety with use of AE as needed  Short term goal 2: Pt will complete functional transfers/mobility during self care tasks with modified independence and good safety   Short term goal 3: Pt will verbalize/demonstrate good understanding of AE/adaptive strategies/DME to increase independence with self care while maintaining hip precautions    Plan  Safety Devices  Safety Devices in place: Yes  Type of devices:  All fall risk precautions in place, Call light within reach, Gait belt, Patient at risk for falls, Left in bed, Nurse notified     Plan  Times per week: 5-7 (1-2 times per day)  Current Treatment Recommendations: Self-Care / ADL, Strengthening, Balance Training, Functional Mobility Training, Endurance Training, Safety Education & Training, Patient/Caregiver Education & Training, Equipment Evaluation, Education, & procurement, Home Management Training       Equipment Recommendations  Equipment Needed:  (TBD)  OT Individual Minutes  Time In: 7179  Time Out: 1004  Minutes: 43    Electronically signed by Jolene Way OT on 12/16/21 at 2:36 PM EST

## 2021-12-16 NOTE — OP NOTE
[address]                 [address] [place], [address]                                OPERATIVE REPORT    PATIENT NAME: Jean Gale                    :        1955  MED REC NO:   [id number]                              ROOM:       2061  ACCOUNT NO:   [de-identified]                           ADMIT DATE: 12/15/2021  PROVIDER:     Carlyn Jacob    DATE OF PROCEDURE:  12/15/2021    PREOPERATIVE DIAGNOSIS:  Degenerative arthritis left hip. POSTOPERATIVE DIAGNOSIS:  Degenerative arthritis left hip. SURGEON:  Carlyn Jacob MD    FIRST ASSISTANT:  KERRY Velasco; who was used for patient  positioning, retraction, suctioning, aid in the hip reduction, and wound  closure. ANESTHESIA:  General.    ESTIMATED BLOOD LOSS:  200. COMPLICATIONS:  None. SPECIMEN:  None. IMPLANTS:  [employer] G7 acetabulum size 54 with a 36-mm head _____. DRAINS:  None. FINDINGS:  1. Excellent stability intraoperatively of hip post implantation. 2.  Cross-table AP fluoroscopy pelvis moderately rotated. Length and  component position appeared quite acceptable. PROCEDURE IN DETAIL:  The patient taken to the operating room, placed  under general anesthesia, positioned in the lateral decubitus position. Left lower extremity was prepped and draped in the usual sterile  fashion, posterior lateral approach to the hip with incision through  skin and subcutaneous fat, tensor fascia emy. Trochanteric bursa was  taken down. Piriformis was identified. T-capsulotomy in line with the  posterior edge of the piriformis was made. Hip was dislocated. Femoral  neck cut was made. Femoral head was noted to be completely denuded of  articular cartilage. The patient with a moderately-large labrum. This  was surgically excised, followed by clearing of the _____.   Hip was  sequentially reamed to a size 53 and a 54 acetabular cup was impacted  into position with very stable fixation, although we may have been about  a millimeter shy of getting it fully seated, but it was so stable that  it was accepted. Acetabular high wall liner was snapped into position, checked with a  freer and attention was directed to the femur. Lateral cookie cutter  canal finder was sequentially broached to a 15 trial with a standard  offset, standard neck, excellent stability. Hip was dislocated. The  rasp was removed. The final femoral implant was impacted into position,  trialed with a standard neck, dislocated again. Standard neck was  placed. Hip was reduced. T-capsulotomy was repaired with #2 Vicryl  suture after injecting the capsule with local anesthetic. Hip was  soaked with antiseptic solution and a cross-table AP x-ray was obtained  showing very satisfactory femoral component. The acetabulum looks  acceptable, although the pelvis was moderately rotated. X-ray was obtained and components were accepted. The hip was thoroughly  irrigated. Tensor fascia emy reapproximated with #2 Vicryl sutures,  subcuticular with 2-0 Vicryl followed by skin staples and sterile  dressing. The patient was awakened from anesthesia, taken to recovery  room in stable condition. COMPLICATIONS ARISE DURING THE OPERATION:  None noted.         RUBY Sam Cera    D: 12/15/2021 15:39:28       T: 12/15/2021 15:43:32     [email or web address]  Job#: [id number]     Doc#: [id number]    CC:

## 2021-12-16 NOTE — PROGRESS NOTES
Physical Therapy    Facility/Department: [facility] MED SURG  Initial Assessment    NAME: Adelita Garcia  : 1955  MRN: [id number]    Date of Service: 2021    Discharge Recommendations:  Home with assist PRN, Outpatient PT   PT Equipment Recommendations  Equipment Needed: No    Assessment   Body structures, Functions, Activity limitations: Decreased functional mobility   Assessment: No further in-pt PT needed at this time. pt has good understanding of precautions and has functionally mobility with only min assist needed for bed mobility  Decision Making: Low Complexity  History: L MONICA  Exam: decreased mobility  Clinical Presentation: stable  No Skilled PT: Safe to return home  REQUIRES PT FOLLOW UP: No       Patient Diagnosis(es): The primary encounter diagnosis was Arthritis, hip. Diagnoses of Arthritis and Hip pain were also pertinent to this visit. has a past medical history of Abnormal EKG, Arthritis of left hip, 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) ([address] Chest tightness, Chronic kidney disease, stage 3 ([address] History of COVID-19, Hyperlipidemia, Hypertension, Intermittent palpitations, Left hip pain, LVH (left ventricular hypertrophy), Near syncope, Osteoarthritis, Pneumonia due to COVID-19 virus, Type II or unspecified type diabetes mellitus without mention of complication, not stated as uncontrolled, and Wears glasses. has a past surgical history that includes Inguinal hernia repair (Bilateral); Appendectomy; Tonsillectomy and adenoidectomy; Hand surgery (Right); shoulder surgery (Left); joint replacement; Colonoscopy (N/A, 2019); eye surgery (Left); ablation of dysrhythmic focus (2021); Cataract removal with implant (Right); Umbilical hernia repair; and Total hip arthroplasty (Left, 12/15/2021).     Restrictions  Restrictions/Precautions  Restrictions/Precautions: Weight Bearing, ROM Restrictions, Surgical Protocols, Fall Risk, Up as Tolerated  Required Braces or Orthoses?: No  Implants present? : Metal implants (B MONICA)  Lower Extremity Weight Bearing Restrictions  Left Lower Extremity Weight Bearing: Weight Bearing As Tolerated (FWBAT)  Position Activity Restriction  Hip Precautions: Posterior hip precautions    Subjective  General  Patient assessed for rehabilitation services?: Yes  Family / Caregiver Present: No  Follows Commands: Within Functional Limits  Subjective  Subjective: pt eager to walk in schuler  Pain Screening  Patient Currently in Pain: Yes  Pain Assessment  Pain Assessment: 0-10  Pain Level: 6  Pain Type: Surgical pain  Pain Location: Hip  Pain Orientation: Left  Pain Frequency: Continuous  Vital Signs  Patient Currently in Pain: Yes       Orientation  Orientation  Overall Orientation Status: Within Normal Limits  Social/Functional History  Social/Functional History  Lives With: Spouse, Daughter, Family (Daughters  and son)  Type of Home: House  Home Layout: Multi-level, Bed/Bath upstairs (tri-level; 1/2 bath on main level down)  Home Access: Stairs to enter without rails  Entrance Stairs - Number of Steps: 1+1 KARMA; 5 steps up to bedroom/bathroom with R HR; 5 steps down to family room with HR R side  Bathroom Shower/Tub: Tub/Shower unit, Doors  Bathroom Toilet: Standard  Bathroom Equipment: Toilet raiser  Bathroom Accessibility: Walker accessible  Home Equipment: Cane, Rolling walker, Reacher (Looking to buy wedge for bed)  ADL Assistance: Independent  Homemaking Assistance: Independent (wife and daughter complete [de-identified])  Homemaking Responsibilities: Yes (wife and daughter complete most)  Ambulation Assistance: Independent  Transfer Assistance: Independent  Active : Yes  Mode of Transportation: Truck, SUV  Occupation: Full time employment  Type of occupation:  for [employer]  IADL Comments: Pt reports sleeping in flat  Additional Comments: Pt reports that he has the ability to work from home.  Pt reports that he has supportive family that can assist as needed.         Objective          AROM RLE (degrees)  RLE AROM: WNL  AROM LLE (degrees)  LLE AROM : WNL (knee/ankle)  Strength RLE  Strength RLE: WNL  Strength LLE  Strength LLE: WFL (knee/ankle)  Comment: hip not tested        Bed mobility  Supine to Sit: Minimal assistance  Sit to Supine: Minimal assistance  Scooting: Stand by assistance  Comment: bed flat- Paige to left leg only  Transfers  Sit to Stand: Stand by assistance  Stand to sit: Stand by assistance  Ambulation  Ambulation?: Yes  Ambulation 1  Device: Rolling Walker  Assistance: Stand by assistance  Gait Deviations: Slow Batool; Decreased step length  Distance: 100ft x 2  Stairs/Curb  Stairs?: Yes  Stairs  # Steps : 4  Stairs Height: 8\"  Rails: Right ascending  Device: No Device  Assistance: Contact guard assistance     Balance  Sitting - Static: Good  Sitting - Dynamic: Good  Standing - Static: Fair; + (SBA)  Standing - Dynamic: Fair (CGA)        Plan   Plan  Plan Comment: no further in-pt PT needed  Safety Devices  Type of devices: Left in bed         Therapy Time   Individual Concurrent Group Co-treatment   Time In 0921         Time Out 1004         Minutes 43         Timed Code Treatment Minutes: 2588 Ivan Wolf, PT

## 2021-12-16 NOTE — PLAN OF CARE
Problem: Falls - Risk of:  Goal: Will remain free from falls  Outcome: Ongoing  Goal: Absence of physical injury  Outcome: Ongoing  Note: Pt remained free of any injury or fall throughout the shift. Bed remained in lowest position, side rails up x2, call light within reach. Problem: Pain:  Goal: Pain level will decrease  Outcome: Ongoing  Goal: Control of acute pain  Outcome: Ongoing  Note: Patient was given medication and non pharmacological care to help control pain and increase comfort. Patient tolerating well.      Goal: Control of chronic pain  Outcome: Ongoing Allergy;

## 2021-12-16 NOTE — PROGRESS NOTES
Writer went through discharge paperwork with pt. Pt denied any questions and states an understanding of his discharge paperwork. Writer let pt know of his [facility] tomorrow at 4 pm, however, pt states they called him and his wife changing the time to around 10 am. Pt states he will double check the time for that appointment. Writer gave pt his aspirin and percocet prescription. Writer removed pt IV, catheter intact. Writer has all his belongings. Just waiting on wife to come pick him up. PCT will take pt down to his car.

## 2021-12-16 NOTE — CONSULTS
Dr. Desiree [facility]    CHIEF COMPLAINT: No chief complaint on file. Reason for Admission: Status post left total hip arthritis    History Obtained From:  Patient     HISTORY OF PRESENT ILLNESS:      The patient is a pleasant 73 y.o. male with significant medical history of pretension diabetes and DJD of his health presented with status post left total helps x1 day doing okay pain is controlled. He has no chest pain no shortness of breath. Patient tells me he has had his right hip done about 7 years ago did okay with that. He had recent knee ablation on his heart form and SVT was done in June he had Covid pneumonia before that he was wondering if that is what caused his issues. But since then has been okay he has no chest pain or shortness of breath no nausea or vomiting at this time. He got little bit dizzy the first couple times he stood up he said but the third time he stood up he was okay. Patient might be going home today. Asked him to call us if he continues to have problems with his either blood pressure when he moves around or dizziness.     Past Medical History:    Past Medical History:   Diagnosis Date    Abnormal EKG 4/23/2021    Arthritis of left hip     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) ([address]     Chest tightness 04/23/2021    Chronic kidney disease, stage 3 ([address] 04/23/2021    History of COVID-19 04/23/2021    Hyperlipidemia     Hypertension     Intermittent palpitations 04/23/2021    Left hip pain     LVH (left ventricular hypertrophy) 05/27/2021    Near syncope 11/14/2020    Osteoarthritis     Pneumonia due to COVID-19 virus 11/15/2020    Type II or unspecified type diabetes mellitus without mention of complication, not stated as uncontrolled     Wears glasses      Patient Active Problem List   Diagnosis Code    Knee pain M25.569    Hip pain M25.559    Arthritis, hip M16.10    Status post right hip replacement Z96.641    History of right hip replacement Z96.641    Abnormal EKG R94.31    Chest tightness R07.89    Chronic kidney disease, stage 3 (HCC) N18.30    History of COVID-19 Z86.16    Intermittent palpitations R00.2    LVH (left ventricular hypertrophy) I51.7    Near syncope R55    Pneumonia due to COVID-19 virus U07.1, J12.82       Past Surgical History:       Past Surgical History:   Procedure Laterality Date    ABLATION OF DYSRHYTHMIC FOCUS  06/04/2021    AVNRT Ablation- CARTO; Surgeon: Dago Marlow MD; Location: [facility] (); Service: Cardiology; Laterality: N/A;    APPENDECTOMY      CATARACT REMOVAL WITH IMPLANT Right     COLONOSCOPY N/A 05/30/2019    COLONOSCOPY POLYPECTOMY HOT BIOPSY performed by Abel Lambert MD at [address] Left     orbital wall fracture.  HAND SURGERY Right     joint replacement.  INGUINAL HERNIA REPAIR Bilateral     JOINT REPLACEMENT      total right hip Feb 2014  Dr. Nirmal Hagen Left     bone shaved, and impingement.     TONSILLECTOMY AND ADENOIDECTOMY      TOTAL HIP ARTHROPLASTY Left 12/15/2021    HIP TOTAL ARTHROPLASTY performed by Deven Garcia MD at [address]         Current Medications:    Current Facility-Administered Medications   Medication Dose Route Frequency Provider Last Rate Last Admin    atorvastatin (LIPITOR) tablet 80 mg  80 mg Oral Daily Deven Garcia MD   80 mg at 12/16/21 0757    carvedilol (COREG) tablet 12.5 mg  12.5 mg Oral BID  Deven Garcia MD   12.5 mg at 12/16/21 0757    Dulaglutide SOPN 1.5 mg  1.5 mg SubCUTAneous Weekly Deven Garcia MD        metFORMIN (GLUCOPHAGE) tablet 1,000 mg  1,000 mg Oral BID  Deven Garcia MD   1,000 mg at 12/16/21 0757    pantoprazole (PROTONIX) tablet 40 mg  40 mg Oral QAM AC Deven Garcia MD   40 mg at 12/16/21 0510    sodium chloride flush 0.9 % injection 5-40 mL  5-40 mL IntraVENous 2 times per day Deven Garcia MD   10 mL at 12/16/21 0758    sodium chloride flush 0.9 % injection 5-40 mL  5-40 mL IntraVENous PRN Jessica Mcclain MD        0.9 % sodium chloride infusion  25 mL IntraVENous PRN Jessica Mcclain MD        acetaminophen (TYLENOL) tablet 650 mg  650 mg Oral Q6H Jessica Mcclain MD   650 mg at 12/16/21 0507    oxyCODONE (ROXICODONE) immediate release tablet 5 mg  5 mg Oral Q4H PRN Jessica Mcclain MD        Or   Vee Monroe oxyCODONE HCl (OXY-IR) immediate release tablet 10 mg  10 mg Oral Q4H PRN Jessica Mcclain MD   10 mg at 12/16/21 0510    morphine (PF) injection 2 mg  2 mg IntraVENous Q2H PRN Jessica Mcclain MD        Or   Vee Monroe morphine sulfate (PF) injection 4 mg  4 mg IntraVENous Q2H PRN Jessica Mcclain MD        promethazine (PHENERGAN) tablet 12.5 mg  12.5 mg Oral Q6H PRN Jessica Mcclain MD        Or    ondansetron [facility]) injection 4 mg  4 mg IntraVENous Q6H PRN Jessica Mcclain MD        aspirin EC tablet 81 mg  81 mg Oral BID Jessica Mcclain MD   81 mg at 12/16/21 0757    losartan (COZAAR) tablet 100 mg  100 mg Oral Daily Jessica Mcclain MD   100 mg at 12/16/21 0757    And    hydroCHLOROthiazide (HYDRODIURIL) tablet 25 mg  25 mg Oral Daily Jessica Mcclain MD   25 mg at 12/16/21 8842       Allergies:  Penicillins    Social History:    reports that he has never smoked. His smokeless tobacco use includes chew. He reports current alcohol use. He reports that he does not use drugs.     Family History:   Family History   Problem Relation Age of Onset    High Blood Pressure Father     Diabetes Father     Cancer Father         Skin    Heart Disease Maternal Grandfather     Diabetes Paternal Grandmother     Diabetes Paternal Grandfather        REVIEW OF SYSTEMS:  RESPIRATORY:  negative for  dry cough, dyspnea, wheezing and chest pain positive   CARDIOVASCULAR:  negative for  chest pain, dyspnea, palpitations, orthopnea, exertional chest pressure/discomfort, fatigue, edema, syncope  GASTROINTESTINAL:  negative for nausea, vomiting, change in bowel habits, diarrhea, constipation, abdominal pain and reflux   GENITOURINARY:  negative for frequency, dysuria, nocturia, urinary incontinence and hesitancy positive for   HEMATOLOGIC/LYMPHATIC:  negative for easy bruising, bleeding and swelling/edemapositive for {  ENDOCRINE:  negative for weight changes, change in bowel habits and diabetic symptoms including neither polyuria nor polydipsia nor blurred vision nor foot ulcerations nor neurop  MUSCULOSKELETAL:  negative for  myalgias, arthralgias, pain, joint swelling, stiff joints and muscle weakness   NEUROLOGICAL:  negative for headaches, dizziness, memory problems, speech problems, visual disturbance, gait problems, weakness and numbness    Vitals:  /69   Pulse 98   Temp 97.7 °F (36.5 °C)   Resp 16   Ht 5' 9\" (1.753 m)   Wt 207 lb (93.9 kg)   SpO2 95%   BMI 30.57 kg/m²     PHYSICAL EXAM:    CONSTITUTIONAL:  awake, alert, cooperative, no apparent distress, and appears stated age  EYES:  Lids and lashes normal, pupils equal, round and reactive to light, extra ocular muscles intact, sclera clear, conjunctiva norm  ENT:  Normocephalic, without obvious abnormality, atraumatic, sinuses nontender on palpation, external ears without lesions, oral pharynx with moist mucus membranes, tonsils without erythema or exudates,    NECK:  Supple, symmetrical, trachea midline, no adenopathy, thyroid symmetric, not enlarged and no tenderness, skin normal    HEMATOLOGIC/LYMPHATICS:  no cervical lymphadenopathy  BACK:  Symmetric, no curvature, spinous processes are non-tender on palpation, paraspinous muscles are non-tender on palpation, no costal vertebral tenderness    LUNGS:  No increased work of breathing, good air exchange, clear to auscultation bilaterally, no crackles or wheezing  CARDIOVASCULAR:  Normal apical impulse, regular rate and rhythm, normal S1 and S2, no S3 or S4, and no murmur noted  ABDOMEN:  No scars, normal bowel sounds, soft, non-distended, non-tender, no masses palpated, no hepatosplenomegally    GENITAL/URINARY: na  MUSCULOSKELETAL:  There is no redness, warmth, or swelling of the joints. Full range of motion noted. Motor strength is 5 out of 5 all extremities bilaterally. Tone is normal.  Left hip incision is intact the slight pinkish but that is from edema. But no signs of infection at this time  NEUROLOGIC:  Awake, alert, oriented to name, place and time. Cranial nerves II-XII are grossly intact. Motor is 5 out of 5 bilaterally. Sensory is intact. gait is normal.      SKIN:  no bruising or bleeding, normal skin color, texture, turgor, no redness, warmth, or swelling and no rashes     RECENT DATA:  No results found for: CBC, BMP    DATA:   Component Value Units   XR HIP LEFT (1 VIEW) [[id number]]    Collected: 12/15/21 1510    Updated: 12/15/21 1722    Narrative:     EXAMINATION:   ONE XRAY VIEW OF THE LEFT HIP     12/15/2021 2:27 pm     COMPARISON:   11/04/2021     HISTORY:   ORDERING SYSTEM PROVIDED HISTORY: Arthritis   TECHNOLOGIST PROVIDED HISTORY:   left total hip in OR   Reason for Exam: Left total hip in OR     FINDINGS:   Single view left hip obtained in surgery shows placement of a left hip   prosthesis which appears in satisfactory alignment on this projection.  Open   wound and surgical instrument noted.  Small pelvic vascular calcifications. Impression:     Single image in surgery during left hip arthroplasty.     POC Glucose Fingerstick [[id number]] (Abnormal)    Collected: 12/15/21 1643    Updated: 12/15/21 1649     POC Glucose 158 High  mg/dL   POC Glucose Fingerstick [[id number]] (Abnormal)    Collected: 12/15/21 1545    Updated: 12/15/21 1552     POC Glucose 159 High  mg/dL   POC Glucose Fingerstick [[id number]] (Abnormal)    Collected: 12/15/21 1307    Updated: 12/15/21 1315     POC Glucose 114 High  mg/dL       Current IP Meds  Hide  (From admission, onward)          ASSESSMENT:  Patient Active Problem List   Diagnosis Code    Knee pain M25.569    Hip pain M25.559    Arthritis, hip M16.10    Status post right hip replacement Z96.641    History of right hip replacement Z96.641    Abnormal EKG R94.31    Chest tightness R07.89    Chronic kidney disease, stage 3 (HCC) N18.30    History of COVID-19 Z86.16    Intermittent palpitations R00.2    LVH (left ventricular hypertrophy) I51.7    Near syncope R55    Pneumonia due to COVID-19 virus U07.1, J12.82     · Hypertension  ·   · History of SVT  ·   · History of diabetes mellitus  ·   · History of previous right hip replacement  ·   · Status post cardiac ablation    PLAN:  · Continue with patient's home meds and Neupro monitor his blood pressure and orthostasis  ·   · Patient might be going home asked him if he continues to feel dizzy when he moves to let us know so we could adjust his blood pressure meds otherwise he continue on the same medications and to call if any problem        Electronically signed by YOSEF Tavares on 12/16/2021 at [address] [facility]

## 2021-12-16 NOTE — CARE COORDINATION
Writer scheduled pt. For Outpt PT at [facility], for 12/17/21 at 4:00PM.    Information placed on AVS & pt notified.

## 2021-12-17 ENCOUNTER — CARE COORDINATION (OUTPATIENT)
Dept: CASE MANAGEMENT | Age: 66
End: 2021-12-17

## 2021-12-17 DIAGNOSIS — M25.559 HIP PAIN: Primary | ICD-10-CM

## 2021-12-17 NOTE — DISCHARGE SUMMARY
Discharge Summary    Attending Physician: No att. providers found  Admit Date: 12/15/2021  Discharge Date: 12/16/2021   Primary Care Physician: Tess Tucker DO    Admitting Diagnosis:  Active Problems:    Hip pain  Resolved Problems:    * No resolved hospital problems. *        Discharge Diagnoses: Active Problems:    Hip pain  Resolved Problems:    * No resolved hospital problems. *         Past Medical History:   Diagnosis Date    Abnormal EKG 4/23/2021    Arthritis of left hip     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) [facility]     Chest tightness 04/23/2021    Chronic kidney disease, stage 3 ([facility] 04/23/2021    History of COVID-19 04/23/2021    Hyperlipidemia     Hypertension     Intermittent palpitations 04/23/2021    Left hip pain     LVH (left ventricular hypertrophy) 05/27/2021    Near syncope 11/14/2020    Osteoarthritis     Pneumonia due to COVID-19 virus 11/15/2020    Type II or unspecified type diabetes mellitus without mention of complication, not stated as uncontrolled     Wears glasses        Procedures Performed and Findings  Procedure(s):  HIP TOTAL ARTHROPLASTY     Consultations Obtained  IP CONSULT TO Juan C Barrera  uncomplicated    Discharge Medications       Medication List      START taking these medications    aspirin EC 81 MG EC tablet  Take 1 tablet by mouth 2 times daily  Replaces: aspirin 81 MG tablet     oxyCODONE-acetaminophen 5-325 MG per tablet  Commonly known as: Percocet  Take 1 tablet by mouth every 6 hours as needed for Pain for up to 7 days. Intended supply: 7 days.  Take lowest dose possible to manage pain        CONTINUE taking these medications    atorvastatin 80 MG tablet  Commonly known as: LIPITOR     carvedilol 25 MG tablet  Commonly known as: COREG     GLUCOSAMINE CHOND CMP ADVANCED PO     metFORMIN 1000 MG tablet  Commonly known as: GLUCOPHAGE     NONFORMULARY     olmesartan-hydroCHLOROthiazide 40-25 MG per tablet  Commonly known as: BENICAR HCT     omeprazole 20 MG delayed release capsule  Commonly known as: PRILOSEC     therapeutic multivitamin-minerals tablet     Trulicity 1.5 IK/4.1QF Sopn  Generic drug: Dulaglutide     vitamin C 250 MG tablet        STOP taking these medications    aspirin 81 MG tablet  Replaced by: aspirin EC 81 MG EC tablet           Where to Get Your Medications      You can get these medications from any pharmacy    Bring a paper prescription for each of these medications  · aspirin EC 81 MG EC tablet  · oxyCODONE-acetaminophen 5-325 MG per tablet          Discharge Condition  Stable       Activity on Discharge  As tolerated       Discharge Disposition:  Home    Discharge Instructions  See Orders    Follow-Up Scheduled    Terence Whalen MD  [facility], [address] [facility], [place] [address]  [phone]      Surgical Follow up 1/4/22 at 1:20 PM.    [address] [address]  [address]  [place] for 12/17/21 at 4:00 PM.       Electronically signed by Terence Whalen MD on 12/17/2021 at 7:56 AM

## 2021-12-17 NOTE — CARE COORDINATION
Jay 45 Transitions Initial Follow Up Call    Call within 2 business days of discharge: Yes    Patient: Yasmany Griffiths Patient : 1955   MRN: <[id number]>  Reason for Admission: Total hip arthropasty  Discharge Date: 21 RARS: Readmission Risk Score: 6.2 ( )      Last Discharge [facility]       Complaint Diagnosis Description Type Department Provider    12/15/21  Arthritis, hip . .. Admission (Discharged) Celso Gray MD           Spoke with: 24 initial call. Spoke to [facility]. [facility] states he has pain rated 4/10 in his hip. . takes Percocet PRN with effect. Staples and steri strips in place. Uses ice PRN. Ambulates with a walker. Appetite and fluid intake good. Pt. States he had low BP this morning and he did not start outpatient PT. His wife called to Dr Beltran Dean and BP meds were held. Appetite is good. Encouraged to increase fluids. Reviewed f/u appt with Ortho on 22. Wife to transport. Medication review completed. Himanshu Potter FINAL CALL. Facility: [address] [place] [address] of Care Initial Call    Was this an external facility discharge? No Discharge Facility: N/A    Challenges to be reviewed by the provider   Additional needs identified to be addressed with provider: No  none             Method of communication with provider : none      Advance Care Planning:   Does patient have an Advance Directive: reviewed and current. Was this a readmission? No  Patient stated reason for admission: HIP SURGERY  Patients top risk factors for readmission: medical condition-HIP PAIN HIP SURGERY    Care Transition Nurse (CTN) contacted the patient by telephone to perform post hospital discharge assessment. Verified name and  with patient as identifiers. Provided introduction to self, and explanation of the CTN role. CTN reviewed discharge instructions, medical action plan and red flags with family who verbalized understanding.  Patient given an opportunity to ask questions and does not have any further questions or concerns at this time. Were discharge instructions available to patient? Yes. Reviewed appropriate site of care based on symptoms and resources available to patient including: PCP, Specialist and When to call 911. The patient agrees to contact the PCP office for questions related to their healthcare. Medication reconciliation was performed with patient, who verbalizes understanding of administration of home medications. Advised obtaining a 90-day supply of all daily and as-needed medications. Covid Risk Education     Educated patient about risk for severe COVID-19 due to risk factors according to CDC guidelines. LPN CC reviewed discharge instructions, medical action plan and red flag symptoms with the patient who verbalized understanding. Discussed COVID vaccination status: Yes. Education provided on COVID-19 vaccination as appropriate. Discussed exposure protocols and quarantine with CDC Guidelines. Patient was given an opportunity to verbalize any questions and concerns and agrees to contact LPN CC or health care provider for questions related to their healthcare. Reviewed and educated patient on any new and changed medications related to discharge diagnosis. Was patient discharged with a pulse oximeter? No Discussed and confirmed pulse oximeter discharge instructions and when to notify provider or seek emergency care. LPN CC provided contact information. No further follow-up call indicated based on severity of symptoms and risk factors.   Plan for next call: N/A        Non-face-to-face services provided:  Obtained and reviewed discharge summary and/or continuity of care documents    Care Transitions 24 Hour Call    Do you have any ongoing symptoms?: Yes  Patient-reported symptoms: Pain  Interventions for patient-reported symptoms:  (Comment: called PCP)  Do you have a copy of your discharge instructions?: Yes  Care Transitions Interventions         Follow Up  Future Appointments   Date Time Provider Alejandra Ann   1/4/2022  1:20 PM Mari Read MD [facility] [address] LPN Care Transitions Nurse   [phone]

## 2021-12-20 ENCOUNTER — HOSPITAL ENCOUNTER (OUTPATIENT)
Dept: PHYSICAL THERAPY | Age: 66
Setting detail: THERAPIES SERIES
Discharge: HOME | End: 2021-12-20
Payer: COMMERCIAL

## 2021-12-20 PROCEDURE — 97161 PT EVAL LOW COMPLEX 20 MIN: CPT

## 2021-12-20 PROCEDURE — 97016 VASOPNEUMATIC DEVICE THERAPY: CPT

## 2021-12-20 PROCEDURE — 97110 THERAPEUTIC EXERCISES: CPT

## 2021-12-20 NOTE — CONSULTS
[x] [facility]) - [facility] & Therapy  [address]  [place]: [phone]   F: [phone]       Date:  2021  Patient: Guerline Arreguin  : 1955  MRN: [id number]  Physician: Mena Damico MD  Insurance: [facility]  --  [employer]   Medical Diagnosis:      M16.10 (ICD-10-CM) - Arthritis, hip     Rehab Codes: R25 pain , M25.60 stiffness, R53.1 weakness  Onset Date: 12/15/21- DOS    Next Dr.'s appt: 22 - Dr. Mariluz Stubbs: Posterior hip precautions no flexion past 90, no adduction past midline, avoiding excessive rotation    Subjective:   CC: Lt MONICA    Pt presents ambulatory with a cane with antalgic gait at POD #4. Was supposed to have eval last week but cancelled due to low blood pressure. He notes he had surgery due to having L hip pain after completing yard work. Denies any falls since hospitalization. He notes he is trying to be active at home with walking and stairs. He recalls all his activity precautions. He is using the walker mainly at home but just using a cane due to ease of taking it into the car. Pt reports icing as needed and taking tylenol with night time percocet.      PMHx: [] Unremarkable [x] Diabetes [x] HTN  [] Pacemaker   [x] MI/Heart Problems [] Cancer [] Arthritis [] Other:              [] Refer to full medical chart  In EPIC     Comorbidities:   [x] Obesity [] Dialysis  [x] Other: prior R hip MONICA  7 years ago    [] Asthma/COPD [] Dementia [x] Other: COVID-19 pneumonia   [] Stroke [] Sleep apnea [x] Other: recent heart abalation   [] Vascular disease [] Rheumatic disease [] Other:     Preferred Language:   [x] English           [] Other:    Prior Imaging: no imaging post-op     Previous Treatment: none     Home Environment: tri-level home, 1+ 1 step to enter; 5 to get to bedroom, 5 to family room -- handrails on R at both; tub shower     Medications: [x] Refer to full medical record [] None [x] Other: percocet (only taking at night); tylenol in between  Allergies:      [x] Refer to full medical record [] None [] Other:    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     Prior Level of Function: independent -- yard work     Pain:  [x] Yes  [] No Location: L hip  Pain Rating: (0-10 scale) 4/10  Pain altered Tx:  [] Yes  [x] No  Action:    Symptoms:  [x] Improving [] Worsening [] Same  Aggravating factors: prolonged positions   Alleviating factors: meds & Ice      Functional Status Previous level of function Current level of function Comments   Sitting [x] Independent  [] Deficit [] Independent  [x] Deficit Pain with prolonged sitting    Standing/walking [x] Independent  [] Deficit [] Independent  [x] Deficit Using AD; pain    Driving [x] Independent  [] Deficit [] Independent  [x] Deficit Wife driving for time being    Housekeeping/Meal Preparation [x] Independent  [] Deficit [] Independent  [x] Deficit Limited standing toleramnce   Lifting/Carrying [x] Independent  [] Deficit [] Independent  [x] Deficit Limited by hip pain and weakness   Bending/Reaching [x] Independent  [] Deficit [] Independent  [x] Deficit Limited by precautions    Stair climbing [x] Independent  [] Deficit [] Independent  [x] Deficit Step to pattern   Pivoting [x] Independent  [] Deficit [] Independent  [x] Deficit Avoiding due to precautions    Squatting [x] Independent  [] Deficit [] Independent  [x] Deficit Limited by surgery      Patient Goals/Rehab Expectations: to be able to return to normal activities       Objective:      Observations:   - still has original dressing on    - wearing compression stockings   - assist to LLE for sit <> supine transfers     Vitals: /78, HR: 90     Lumbar Clearing:  [x] Not tested            [] No Deficit              [] Deficit:      Sensation:  [x] No Deficit (DENIES)        [] Deficit:     Fall risk:  [x] No            [] Yes:          Range of Motion  Left Range of Motion  Right Strength  Left Strength  Right   Hip Flexion Did not assess d/t precautions; at least 90 110 3+ 5   Hip Abduction limited  3- 4   Hip Adduction Did not d/t precautions  3 5   Hip Extension ~10 deg  4 5   Hip ER <10 15-20 3 5   Hip IR <10 10-15 3 5   Knee Flexion WNL for all below WNL for all below 4+  5   Knee Extension   4 5   Dorsiflexion   4+ 5   Plantar flexion   5 5   Inversion   5 5   Eversion   4+  5       MUSCLE LENGTH TESTING Normal Left Tight Right Tight   Glutes []  [x]  []    Piriformis []  []  []    ITB/TFL []  [x]  []    Hip Flexors []  [x]  []    Quads []  [x]  []    Hamstrings []  [x]  []    Gastrocnemius []  []  []    Soleus []  []  []        Joint Mobility: deferred due to post op status     Palpation: mild tenderness around the incision sight, palpable tightness of hip flexors     Gait: Independent []           Modified Independent [x]            Not independent []  Comments: ambulates with quad cane, limited hip extension, foot flat contact, trendelenberg on L, slow dylan, limited hip flexion in swing       Assessment:  Pt presents POD#4 from L MONICA. He is progressing as expected at this point functioning at [facility] with ambulation using an AD and Paige with bed mobility for lifting the L leg in and out of bed. He presents with moderate pain, decreased strength, and limited ROM. He is aware of his activity precautions as he has went through this process with his R hip 7 years ago. Able to initiate treatment with date with light exercises to being functional strengthening and vaso compression for pain and edema control. Feel this pt will benefit from skilled PT to address post-op deficits and maximize functional to his PLOF. Problems:    [x] ? Pain:  [x] ? ROM:  [x] ? Strength:  [x] ? Function:  [] Other    STG: (to be met in 10 treatments)  1. Pt will self report worst pain no greater than 2/10 upon arrival to sessions in order to better tolerate ADLs/work activities with minimal dysfunction  2.  Pt will improve AROM to 90 degs hip flexion in order to demonstrate ability to sit to low surfaces. 3.  Pt will be ambulate with normalized gait mechanics with LRAD to progress ambulation. 4. Pt will demonstrate 3+/5 or greater L hip ABD strength needed for hip stability in SL standing. LTG: (to be met in 20 treatments)  1. Pt will demonstrate improved L LE strength to 4/5 or greater in all major hip muscle groups in order to demonstrate improved stability/strength necessary for unrestricted ADLs/work activities  2. Pt will decrease functional limitation on HOOS JR to < 20% in order to demonstrate improved functional tolerances at PLOF with minimal restriction/dysfunction  3. Pt will be able to ambulate without AD at a gait speed of 1.0 m/s to demonstrate functional community ambulation. 4. Pt will improve ability to navigate x10 steps with R handrail in reciprocal pattern with good control and no increase in pain to allow for access to home. 5. Pt will demonstrate >10 SLS stability with light UE support to safely allow pt to enter/exit her tub shower.   6. Pt will demonstrate independence with a long term HEP for continued progress/maintenance after completion of PT                   Functional Assessment Used: HOOS JR   Current Status Score: 9/28 = 39% functional limitation   Goal Status Score:  <20 % functional limitation     Evaluation Complexity:  History (Personal factors, comorbidities) [] 0 [x] 1-2 [] 3+   Exam (limitations, restrictions) [] 1-2 [x] 3 [] 4+   Clinical presentation (progression) [x] Stable [] Evolving  [] Unstable   Decision Making [x] Low [] Moderate [] High    [x] Low Complexity [] Moderate Complexity [] High Complexity     Rehab Potential:  [x] Good  [] Fair  [] Poor   Suggested Professional Referral:  [x] No  [] Yes:  Barriers to Goal Achievement[de-identified]  [x] No  [] Yes:  Domestic Concerns:  [x] No  [] Yes:    Pt. Education:  [x] Plans/Goals, Risks/Benefits discussed  [x] Home exercise program  Method of Education: [x] Verbal  [x] Demo  [x] Written  Comprehension of Education:  [x] Verbalizes understanding. [x] Demonstrates understanding. [] Needs Review. [x] Demonstrates/verbalizes understanding of HEP/Ed previously given.     Treatment Plan:  [x] Therapeutic Exercise   94785  [] Iontophoresis: 4 mg/mL Dexamethasone Sodium Phosphate  mAmin  60433   [x] Therapeutic Activity  39871 [x] Vasopneumatic cold with compression  14371    [x] Gait Training   60003 [] Ultrasound   97184   [x] Neuromuscular Re-education  00877 [] Electrical Stimulation Unattended  64932   [x] Manual Therapy  00896 [] Electrical Stimulation Attended  61190   [x] Instruction in HEP  [] Lumbar/Cervical Traction  92683   [] Aquatic Therapy   68771 [] Cold/hotpack    [] Massage   22520      [] Dry Needling, 1 or 2 muscles  09443   [] Biofeedback, first 15 minutes   20098  [] Biofeedback, additional 15 minutes   72783 [] Dry Needling, 3 or more muscles  30041       Frequency: 2 x/week for 20 visits    Todays Treatment:  Modalities:    Game Ready   - temp: 34 degrees   - position: supine  - time: 10 minutes   - pressure level: Low     Exercises:  Exercise Reps/ Time Weight/ Level Comments   Supine       SAQ* x15  With bolster under knee    glute set* x15  With bolster under thighs, lifting to tolerance    Hip adduction squeezes* X10, 5 s hold     Hamstring stretch with strap* 2x30s      Other:  (*) indicates exercise given as HEP     Specific Instructions for next treatment: continue with light hip strengthening exercises at mat level & add standing as appropriate, hip flexor soft tissue as needed, vaso, review HEP if needed     Treatment Charges: Mins Units   [x] Evaluation       [x]  Low       []  Moderate       []  High 35 1   []  Modalities     [x]  Ther Exercise 10 1   []  Manual Therapy     []  Ther Activities     []  Aquatics     []  Neuromuscular     []  Gait Training     []  Dry Needling           1-2 muscles []  Dry Needling           3 or more muscles     [x] Vasocompression 13 1   []  Other       TOTAL TREATMENT TIME: 58 minutes     Time in: 1426    Time Out: 1526    Electronically signed by: Antonio Woodall PT

## 2021-12-22 ENCOUNTER — HOSPITAL ENCOUNTER (OUTPATIENT)
Dept: PHYSICAL THERAPY | Age: 66
Setting detail: THERAPIES SERIES
Discharge: HOME | End: 2021-12-22
Payer: COMMERCIAL

## 2021-12-22 PROCEDURE — 97110 THERAPEUTIC EXERCISES: CPT

## 2021-12-22 NOTE — FLOWSHEET NOTE
[facility]   [address]   Phone: [phone]   Fax: [phone]    Physical Therapy Daily Treatment Note      Date:  2021  Patient Name:  Ursula Maguire    :  1955  MRN: [id number]  Physician: Angeles Pickard MD                       Insurance: [facility]  --  [employer]   Medical Diagnosis:       M16.10 (ICD-10-CM) - Arthritis, hip      Rehab Codes: R25 pain , M25.60 stiffness, R53.1 weakness  Onset Date: 12/15/21- DOS              Next 's appt: 22 - Dr. Doc Garcia     Visit# / total visits:  Cancels/No Shows: 0/0    Subjective:    Pain:  [x] Yes  [] No Location: L hip  Pain Rating: (0-10 scale) 4/10  Pain altered Tx:  [x] No  [] Yes  Action:  Comments: Pt arrives stating he is experiencing achiness and soreness that prevents him from sleeping through the night.      Objective:  Modalities:   Game Ready - pt declined   - temp: 34 degrees   - position: supine  - time: 10 minutes   - pressure level: Low   PRECAUTIONS: Posterior hip precautions no flexion past 90, no adduction past midline, avoiding excessive rotation     Exercises:  Exercise Reps/ Time Weight/ Level Comments   Supine          SAQ* 2x10   With bolster under knee    glute set* x15   With bolster under thighs, lifting to tolerance    Hip adduction squeezes* X10, 5 s hold       Hamstring stretch with strap* 2x30s        Hip abduction isometrics  x10 , 5s            Seated       LAQ  2x10           Standing       Heel raises  20x     Marching to 90 deg  20x     Mini squats  20x     3 way hip bilateral  10x A Small range    Gastroc stretch on slant board  3x30\"     HS stretch on step  3x30\"     Other:  (*) indicates exercise given as HEP      Specific Instructions for next treatment: continue with light hip strengthening exercises at mat level & add standing as appropriate, hip flexor soft tissue as needed, vaso, review HEP if needed       Assessment: [x] Progressing toward goals. Initiated treatment with supine mat table exercises with some discomfort noted however, is able to complete reps asked of him. Verbal cueing was required to prevent trunk lean and remain in small ranges with 3 way hip. Pt has no pain during standing exercises however, has complaints of weakness and instability of the L LE requiring UE assist. Pt deferred ice as he states he will ice at home. [] No change. [] Other:    [x] Patient would continue to benefit from skilled physical therapy services in order to: address post-op deficits and maximize functional to his PLOF. STG: (to be met in 10 treatments)  1. Pt will self report worst pain no greater than 2/10 upon arrival to sessions in order to better tolerate ADLs/work activities with minimal dysfunction  2. Pt will improve AROM to 90 degs hip flexion in order to demonstrate ability to sit to low surfaces. 3.  Pt will be ambulate with normalized gait mechanics with LRAD to progress ambulation. 4. Pt will demonstrate 3+/5 or greater L hip ABD strength needed for hip stability in SL standing. LTG: (to be met in 20 treatments)  1. Pt will demonstrate improved L LE strength to 4/5 or greater in all major hip muscle groups in order to demonstrate improved stability/strength necessary for unrestricted ADLs/work activities  2. Pt will decrease functional limitation on HOOS JR to < 20% in order to demonstrate improved functional tolerances at PLOF with minimal restriction/dysfunction  3. Pt will be able to ambulate without AD at a gait speed of 1.0 m/s to demonstrate functional community ambulation. 4. Pt will improve ability to navigate x10 steps with R handrail in reciprocal pattern with good control and no increase in pain to allow for access to home. 5. Pt will demonstrate >10 SLS stability with light UE support to safely allow pt to enter/exit her tub shower.   6. Pt will demonstrate independence with a long term HEP for continued progress/maintenance after completion of PT       Patient Goals/Rehab Expectations: to be able to return to normal activities        Pt. Education:  [x] Yes  [] No  [x] Reviewed Prior HEP/Ed  Method of Education: [x] Verbal  [] Demo  [] Written  Comprehension of Education:  [] Verbalizes understanding. [] Demonstrates understanding. [] Needs review. [x] Demonstrates/verbalizes HEP/Ed previously given. Plan: [x] Continue per plan of care.    [] Other:      Treatment Charges: Mins Units   []  Modalities     [x]  Ther Exercise 35 2   []  Manual Therapy     []  Ther Activities     []  Aquatics     []  Neuromuscular     [] Vasocompression     [] Gait Training     [] Dry needling        [] 1 or 2 muscles        [] 3 or more muscles     []  Other     Total Treatment time 35 2     Time In: 8:00 am           Time Out: 8:35 am    Electronically signed by:  William Ricardo PTA

## 2021-12-28 ENCOUNTER — HOSPITAL ENCOUNTER (OUTPATIENT)
Dept: PHYSICAL THERAPY | Age: 66
Setting detail: THERAPIES SERIES
Discharge: HOME | End: 2021-12-28
Payer: COMMERCIAL

## 2021-12-28 PROCEDURE — 97110 THERAPEUTIC EXERCISES: CPT

## 2021-12-28 NOTE — FLOWSHEET NOTE
[address] [facility] Physical Therapy   [address]   Phone: [phone]   Fax: [phone]    Physical Therapy Daily Treatment Note      Date:  2021  Patient Name:  Marco Lucero    :  1955  MRN: [id number]  Physician: Sliva Colón MD                       Insurance: [facility]  --  [employer]   Medical Diagnosis:       M16.10 (ICD-10-CM) - Arthritis, hip      Rehab Codes: R25 pain , M25.60 stiffness, R53.1 weakness  Onset Date: 12/15/21- DOS              Next Dr.'s appt: 22 - Dr. Lizett Arzate  Visit# / total visits: 3/ 20 Cancels/No Shows: 0/0      Subjective:   Pt arrives with SPC. Notes his hip is doing well. Reports compliance with HEP. Still having pain more at night and even into the knee.       Pain:  [x] Yes  [] No Location: L hip  Pain Rating: (0-10 scale) 2/10  Pain altered Tx:  [x] No  [] Yes  Action:  Comments:  Objective:  Modalities:     PRECAUTIONS: Posterior hip precautions no flexion past 90, no adduction past midline, avoiding excessive rotation     Exercises:  Exercise Reps/ Time Weight/ Level Completed  Comments   Supine           PROM 3'  x Hip flexion to 90, light hip IR/ER with hip in slight flexion    STM to hip flexor/quad 3'  x           SAQ* 2x15 2# x With bolster under knee    glute set* 2x15   x With bolster under thighs, lifting to tolerance    SLR 2x15  x Therapist Paige when fatigued; slight quad lag with fatigue    Hip adduction squeezes* X15, 5 s hold   x     Hamstring stretch with strap* 2x30s         Hip abduction isometrics  x15 , 5s   x Therapist providing resistance           Seated        LAQ  2x15 2# x Holds 2-3 sec at end range    Seated  Hamstring stretch  2x30s  x Foot propped on stool observing the 90 deg hip flexion restriction           Standing     All with BUE support in // bars    Heel raises  20x  x    Marching to 90 deg  20x  x    Mini squats  20x  x Cues for form    Lateral step with light lunge  x15  x    3 way hip bilateral  15x  x Small range    Step ups :Fwd & lateral  2x10 3\" step x    Gastroc stretch on slant board  3x30\"      HS stretch on step  3x30\"      Other:  (*) indicates exercise given as HEP      Specific Instructions for next treatment: continue with light hip strengthening exercises at mat level & add standing as appropriate, hip flexor soft tissue as needed, vaso if neded. Consider adding sidelying hip ABD with pillow btwn the knees       Assessment: [x] Progressing toward goals. Continued to focus on building strength in the LLE as he continues to recover from surgery. Added light hip PROM to prevent tightness limiting motion and ensuring precautions are followed. Added in manual to R hip flexor and quads as there is notable tightness that is likely causing knee pain when LE straight limiting sleep at night. Post session pt notes his L leg and knee are feeling looser and less stiff. Able to progress exercises with addition of resistance and added in steps ups in standing with minimal pain increase. Overall pt is progressing well in his recovery and continues to gain function. [] No change. [] Other:    [x] Patient would continue to benefit from skilled physical therapy services in order to: address post-op deficits and maximize functional to his PLOF. STG: (to be met in 10 treatments)  1. Pt will self report worst pain no greater than 2/10 upon arrival to sessions in order to better tolerate ADLs/work activities with minimal dysfunction  2. Pt will improve AROM to 90 degs hip flexion in order to demonstrate ability to sit to low surfaces. 3.  Pt will be ambulate with normalized gait mechanics with LRAD to progress ambulation. 4. Pt will demonstrate 3+/5 or greater L hip ABD strength needed for hip stability in SL standing. LTG: (to be met in 20 treatments)  1.  Pt will demonstrate improved L LE strength to 4/5 or greater in all major hip muscle groups in order to demonstrate improved stability/strength necessary for unrestricted ADLs/work activities  2. Pt will decrease functional limitation on HOOS JR to < 20% in order to demonstrate improved functional tolerances at PLOF with minimal restriction/dysfunction  3. Pt will be able to ambulate without AD at a gait speed of 1.0 m/s to demonstrate functional community ambulation. 4. Pt will improve ability to navigate x10 steps with R handrail in reciprocal pattern with good control and no increase in pain to allow for access to home. 5. Pt will demonstrate >10 SLS stability with light UE support to safely allow pt to enter/exit her tub shower. 6. Pt will demonstrate independence with a long term HEP for continued progress/maintenance after completion of PT       Patient Goals/Rehab Expectations: to be able to return to normal activities        Pt. Education:  [x] Yes  [] No  [x] Reviewed Prior HEP/Ed  Method of Education: [x] Verbal  [] Demo  [] Written  Comprehension of Education:  [] Verbalizes understanding. [] Demonstrates understanding. [] Needs review. [x] Demonstrates/verbalizes HEP/Ed previously given. Plan: [x] Continue per plan of care.    [] Other:      Treatment Charges: Mins Units   []  Modalities     [x]  Ther Exercise 41 3   [x]  Manual Therapy 3 -   []  Ther Activities     []  Aquatics     []  Neuromuscular     [] Vasocompression     [] Gait Training     [] Dry needling        [] 1 or 2 muscles        [] 3 or more muscles     []  Other     Total Treatment time 44 3     Time In: 8:45 am           Time Out: 5655 am    Electronically signed by:  Seble Harris, PT

## 2021-12-30 ENCOUNTER — HOSPITAL ENCOUNTER (OUTPATIENT)
Dept: PHYSICAL THERAPY | Age: 66
Setting detail: THERAPIES SERIES
Discharge: HOME | End: 2021-12-30
Payer: COMMERCIAL

## 2021-12-30 PROCEDURE — 97110 THERAPEUTIC EXERCISES: CPT

## 2021-12-30 NOTE — FLOWSHEET NOTE
[address] [facility]   [address]   Phone: [phone]   Fax: [phone]    Physical Therapy Daily Treatment Note      Date:  2021  Patient Name:  Yovana Lancaster    :  1955  MRN: [id number]  Physician: Ladi Bansal MD                       Insurance: [facility]  --  [employer]   Medical Diagnosis:       M16.10 (ICD-10-CM) - Arthritis, hip      Rehab Codes: R25 pain , M25.60 stiffness, R53.1 weakness  Onset Date: 12/15/21- DOS              Next Dr.'s appt: 22 - Dr. Manjit Foote  Visit# / total visits:  Cancels/No Shows: 0/0      Subjective: Sore today- feels he did too much exercise yesterday. Noted relief last visit with soft tissue work. Patient reports wife has been covering incision so he can shower. Pain:  [x] Yes  [] No Location: L hip; left anterior thigh and knee Pain Rating: (0-10 scale) 3/10  Pain altered Tx:  [x] No  [] Yes  Action:    Comments: Arrives to clinic with [facility]- antalgic gait noted with decreased stride and decreased heel strike/toe off with L LE    Objective:  Modalities:     PRECAUTIONS: Posterior hip precautions no flexion past 90, no adduction past midline, avoiding excessive rotation     Exercises:  Exercise Reps/ Time Weight/ Level Completed  Comments   Supine           PROM 3'  x Hip flexion to 90, light hip IR/ER with hip in slight flexion    STM to hip flexor/quad 3'  x           SAQ* 2x15 2# x With Large bolster under knee    Bridge with bolster 2x15   x With large bolster under thighs   SLR 2x15  x Cues for quad set and controlled speed   Hip adduction squeezes* X15, 5 s hold   x     Hamstring stretch with strap* 2x30s    X  Limited range noted   Hip abduction isometrics  x15 , 5s    Therapist providing resistance           Side Lying (L) Hip Abd (pillow between knees) 10x  X Assist with positioning and light assist needed as patient fatigue.             Seated        LAQ  2x15 2# x Holds 2-3 sec at end range Seated  Hamstring stretch  2x30s  x Foot propped on stool observing the 90 deg hip flexion restriction           Standing     All with BUE support in // bars    Heel raises  20x  x    Marching to 90 deg  20x  x    Mini squats  20x  x Cues for form    Lateral step (B) with light lunge  x15  x    3 way hip bilateral  15x  x Small range    Step ups :Fwd & lateral  10x 4\" step x 1 UE support   Gastroc stretch on slant board  3x30\"      HS stretch on step  3x30\"      Other:  (*) indicates exercise given as HEP      Specific Instructions for next treatment: continue with light hip strengthening exercises at mat level & add standing as appropriate, hip flexor soft tissue as needed, vaso if neded. Assessment: [x] Progressing toward goals. Mild soreness with full WB on L LE- progressed proximal hip and quad strength as tolerated. Patient offers great effort throughout program. Continued to educate patient on proper gait to avoid antalgia. Continued light hip PROM to prevent tightness limiting motion and ensuring precautions are followed along with manual to R hip flexor and mid quad as there is notable tightness. Moderate swelling palpated along left lateral hip- patient denies drainage, redness or warmth. [] No change. [] Other:    [x] Patient would continue to benefit from skilled physical therapy services in order to: address post-op deficits and maximize functional to his PLOF. STG: (to be met in 10 treatments)  1. Pt will self report worst pain no greater than 2/10 upon arrival to sessions in order to better tolerate ADLs/work activities with minimal dysfunction  2. Pt will improve AROM to 90 degs hip flexion in order to demonstrate ability to sit to low surfaces. 3.  Pt will be ambulate with normalized gait mechanics with LRAD to progress ambulation. 4. Pt will demonstrate 3+/5 or greater L hip ABD strength needed for hip stability in SL standing. LTG: (to be met in 20 treatments)  1.  Pt will demonstrate improved L LE strength to 4/5 or greater in all major hip muscle groups in order to demonstrate improved stability/strength necessary for unrestricted ADLs/work activities  2. Pt will decrease functional limitation on HOOS JR to < 20% in order to demonstrate improved functional tolerances at PLOF with minimal restriction/dysfunction  3. Pt will be able to ambulate without AD at a gait speed of 1.0 m/s to demonstrate functional community ambulation. 4. Pt will improve ability to navigate x10 steps with R handrail in reciprocal pattern with good control and no increase in pain to allow for access to home. 5. Pt will demonstrate >10 SLS stability with light UE support to safely allow pt to enter/exit her tub shower. 6. Pt will demonstrate independence with a long term HEP for continued progress/maintenance after completion of PT       Patient Goals/Rehab Expectations: to be able to return to normal activities        Pt. Education:  [x] Yes  [] No  [x] Reviewed Prior HEP/Ed  Method of Education: [x] Verbal  [] Demo  [] Written  Comprehension of Education:  [] Verbalizes understanding. [] Demonstrates understanding. [] Needs review. [x] Demonstrates/verbalizes HEP/Ed previously given. Plan: [x] Continue per plan of care.    [] Other:      Treatment Charges: Mins Units   []  Modalities     [x]  Ther Exercise 47 3   [x]  Manual Therapy 3 -   []  Ther Activities     []  Aquatics     []  Neuromuscular     [] Vasocompression     [] Gait Training     [] Dry needling        [] 1 or 2 muscles        [] 3 or more muscles     []  Other     Total Treatment time 50 3     Time In: 1000 AM         Time Out:  0994 AM    Electronically signed by:  Deloris Hess PTA

## 2022-01-03 ENCOUNTER — HOSPITAL ENCOUNTER (OUTPATIENT)
Dept: PHYSICAL THERAPY | Age: 67
Setting detail: THERAPIES SERIES
Discharge: HOME | End: 2022-01-03
Payer: COMMERCIAL

## 2022-01-03 PROCEDURE — 97110 THERAPEUTIC EXERCISES: CPT

## 2022-01-03 NOTE — FLOWSHEET NOTE
[address] [facility] Outpatient Physical Therapy   [address]   Phone: [phone]   Fax: [phone]    Physical Therapy Daily Treatment Note      Date:  1/3/2022  Patient Name:  Katiuska Chery    :  1955  MRN: [id number]  Physician: Tonya Carranza MD                       Insurance: [facility]  --  [employer]   Medical Diagnosis:       M16.10 (ICD-10-CM) - Arthritis, hip      Rehab Codes: R25 pain , M25.60 stiffness, R53.1 weakness  Onset Date: 12/15/21- DOS              Next Dr.'s appt: 22 - Dr. Ben Johnson  Visit# / total visits:  Cancels/No Shows: 0/0      Subjective:   Patient arrives with straight cane in L hand along with improper gait pattern in which patient was at some points carrying cane with steps. Patient states he is less sore than what he usually has been. Pain:  [x] Yes  [] No Location: L hip; left anterior thigh and knee Pain Rating: (0-10 scale) 3/10  Pain altered Tx:  [x] No  [] Yes  Action:    Comments: Arrives to clinic with SPC in LUE- antalgic gait noted with decreased stride and decreased heel strike/toe off with L LE    Objective:  Modalities:     PRECAUTIONS: Posterior hip precautions no flexion past 90, no adduction past midline, avoiding excessive rotation     Exercises:  Exercise Reps/ Time Weight/ Level Completed  Comments   Supine           PROM 3'  x Hip flexion to 90, light hip IR/ER with hip in slight flexion    STM to hip flexor/quad 3'             SAQ* 2x15 2# x With Large bolster under knee    Bridge with bolster 2x15   x With large bolster under thighs   SLR 2x15 1.5# x Cues for quad set and controlled speed   Hip adduction squeezes* X15, 5 s hold   x     Hamstring stretch with strap* 2x30s    X  Limited range noted   Hip abduction isometrics  x15 , 5s    Therapist providing resistance           Side Lying (L) Hip Abd (pillow between knees) 10x  X Assist with positioning and light assist needed as patient fatigue.             Seated LAQ  2x15 2# x Holds 2-3 sec at end range    Seated  Hamstring stretch  2x30s  x Foot propped on stool observing the 90 deg hip flexion restriction           Standing     All with BUE support in // bars    Heel raises  20x  x    Marching to 90 deg  20x  x    Mini squats  20x  x Cues for form    Lateral step (B) with light lunge  x15  x    3 way hip bilateral  15x  x Small range    Step ups :Fwd & lateral  10x 6\" step x 1 UE support   Gastroc stretch on slant board  3x30\"  x    Fwd lunge  10x  x 1 UE support    (B) tandem balance on foam  30\"x3 ea   x           HS stretch on step  3x30\"      Other:  (*) indicates exercise given as HEP      Specific Instructions for next treatment: continue with light hip strengthening exercises at mat level & add standing as appropriate, hip flexor soft tissue as needed, vaso if neded. Assessment: [x] Progressing toward goals. Continued with most recent progressions with additional weight added during  Straight leg raises, and additional standing exercises such as tandem stance, and forward lunge. Patient noted fatigue at end of session but overall had good tolerance to exercises and additions. Educated patient on proper gait pattern with straight cane in opposite UE as what he came with in order to prevent LOB/falls with fair understanding. [] No change. [] Other:    [x] Patient would continue to benefit from skilled physical therapy services in order to: address post-op deficits and maximize functional to his PLOF. STG: (to be met in 10 treatments)  1. Pt will self report worst pain no greater than 2/10 upon arrival to sessions in order to better tolerate ADLs/work activities with minimal dysfunction  2. Pt will improve AROM to 90 degs hip flexion in order to demonstrate ability to sit to low surfaces. 3.  Pt will be ambulate with normalized gait mechanics with LRAD to progress ambulation.    4. Pt will demonstrate 3+/5 or greater L hip ABD strength needed for hip stability in SL standing. LTG: (to be met in 20 treatments)  1. Pt will demonstrate improved L LE strength to 4/5 or greater in all major hip muscle groups in order to demonstrate improved stability/strength necessary for unrestricted ADLs/work activities  2. Pt will decrease functional limitation on HOOS JR to < 20% in order to demonstrate improved functional tolerances at PLOF with minimal restriction/dysfunction  3. Pt will be able to ambulate without AD at a gait speed of 1.0 m/s to demonstrate functional community ambulation. 4. Pt will improve ability to navigate x10 steps with R handrail in reciprocal pattern with good control and no increase in pain to allow for access to home. 5. Pt will demonstrate >10 SLS stability with light UE support to safely allow pt to enter/exit her tub shower. 6. Pt will demonstrate independence with a long term HEP for continued progress/maintenance after completion of PT       Patient Goals/Rehab Expectations: to be able to return to normal activities        Pt. Education:  [x] Yes  [] No  [x] Reviewed Prior HEP/Ed  Method of Education: [x] Verbal  [] Demo  [] Written  Comprehension of Education:  [] Verbalizes understanding. [] Demonstrates understanding. [] Needs review. [x] Demonstrates/verbalizes HEP/Ed previously given. Plan: [x] Continue per plan of care.    [] Other:      Treatment Charges: Mins Units   []  Modalities     [x]  Ther Exercise 45 3   []  Manual Therapy     []  Ther Activities     []  Aquatics     []  Neuromuscular     [] Vasocompression     [] Gait Training     [] Dry needling        [] 1 or 2 muscles        [] 3 or more muscles     []  Other     Total Treatment time 45 3     Time In: 833 AM         Time Out:  834 AM    Electronically signed by:  Fern Llamas PTA

## 2022-01-04 ENCOUNTER — OFFICE VISIT (OUTPATIENT)
Dept: ORTHOPEDIC SURGERY | Age: 67
End: 2022-01-04

## 2022-01-04 VITALS — WEIGHT: 207 LBS | RESPIRATION RATE: 12 BRPM | BODY MASS INDEX: 30.66 KG/M2 | HEIGHT: 69 IN

## 2022-01-04 DIAGNOSIS — M16.12 ARTHRITIS OF LEFT HIP: Primary | ICD-10-CM

## 2022-01-04 PROCEDURE — 99024 POSTOP FOLLOW-UP VISIT: CPT | Performed by: ORTHOPAEDIC SURGERY

## 2022-01-04 RX ORDER — HYDROCODONE BITARTRATE AND ACETAMINOPHEN 5; 325 MG/1; MG/1
1 TABLET ORAL EVERY 6 HOURS PRN
Qty: 28 TABLET | Refills: 0 | Status: SHIPPED | OUTPATIENT
Start: 2022-01-04 | End: 2022-01-11

## 2022-01-04 NOTE — PROGRESS NOTES
Patient ID: Sav Khan is a 73 y.o. male    Chief Compliant:  Chief Complaint   Patient presents with    Post-Op Check     L MONICA 12/15/21        Diagnostic imaging:    AP pelvis lateral left hip status post bilateral total hip arthroplasties left knee most recent with ongoing staples components in satisfactory position    Assessment and Plan:  1. Arthritis of left hip         Patient is doing well post-op    Staples removed    Norco    Follow up in 4 weeks    HPI:  This is a 73 y.o. male who presents to the clinic today post left MONICA on 12/15/2021. Patient is doing well post-operatively. He is ambulating well admits to moderate pain    He has been wearing compression stockings    Review of Systems   All other systems reviewed and are negative. Past History:    Current Outpatient Medications:     aspirin EC 81 MG EC tablet, Take 1 tablet by mouth 2 times daily, Disp: 90 tablet, Rfl: 0    Dulaglutide (TRULICITY) 1.5 MI/4.2IR SOPN, Inject 1.5 mg into the skin once a week Sunday's, Disp: , Rfl:     Multiple Vitamins-Minerals (THERAPEUTIC MULTIVITAMIN-MINERALS) tablet, Take 1 tablet by mouth daily, Disp: , Rfl:     Ascorbic Acid (VITAMIN C) 250 MG tablet, Take 1,000 mg by mouth daily, Disp: , Rfl:     Misc Natural Products (GLUCOSAMINE CHOND CMP ADVANCED PO), Take 2 tablets by mouth daily takes (Glucosamine Chondroitin w/Vit D & MSM)1500mg/160 mg/ 50mg/115mg, Disp: , Rfl:     NONFORMULARY, Take 2 tablets by mouth daily Vitamin B 1,000 MCG daily, Disp: , Rfl:     olmesartan-hydrochlorothiazide (BENICAR HCT) 40-25 MG per tablet, Take 1 tablet by mouth daily, Disp: , Rfl:     atorvastatin (LIPITOR) 80 MG tablet, Take 80 mg by mouth daily. , Disp: , Rfl:     omeprazole (PRILOSEC) 20 MG capsule, Take 20 mg by mouth daily. , Disp: , Rfl:     metFORMIN (GLUCOPHAGE) 1000 MG tablet, Take 1,000 mg by mouth 2 times daily (with meals). , Disp: , Rfl:     carvedilol (COREG) 25 MG tablet, Take 12.5 mg by mouth 2 times daily (with meals) , Disp: , Rfl:   Allergies   Allergen Reactions    Penicillins      Social History     Socioeconomic History    Marital status:      Spouse name: Not on file    Number of children: Not on file    Years of education: Not on file    Highest education level: Not on file   Occupational History    Not on file   Tobacco Use    Smoking status: Never Smoker    Smokeless tobacco: Current User     Types: Chew    Tobacco comment: chew on weekends   Vaping Use    Vaping Use: Never used   Substance and Sexual Activity    Alcohol use: Yes     Comment: rare    Drug use: Never    Sexual activity: Yes     Partners: Female   Other Topics Concern    Not on file   Social History Narrative    Not on file     Social Determinants of Health     Financial Resource Strain:     Difficulty of Paying Living Expenses: Not on file   Food Insecurity:     Worried About Running Out of Food in the Last Year: Not on file    Kenisha of Food in the Last Year: Not on file   Transportation Needs:     Lack of Transportation (Medical): Not on file    Lack of Transportation (Non-Medical):  Not on file   Physical Activity:     Days of Exercise per Week: Not on file    Minutes of Exercise per Session: Not on file   Stress:     Feeling of Stress : Not on file   Social Connections:     Frequency of Communication with Friends and Family: Not on file    Frequency of Social Gatherings with Friends and Family: Not on file    Attends Scientology Services: Not on file    Active Member of Clubs or Organizations: Not on file    Attends Club or Organization Meetings: Not on file    Marital Status: Not on file   Intimate Partner Violence:     Fear of Current or Ex-Partner: Not on file    Emotionally Abused: Not on file    Physically Abused: Not on file    Sexually Abused: Not on file   Housing Stability:     Unable to Pay for Housing in the Last Year: Not on file    Number of [place] in the Last Year: Not on file    Unstable Housing in the Last Year: Not on file     Past Medical History:   Diagnosis Date    Abnormal EKG 4/23/2021    Arthritis of left hip     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) [facility] [address]     Chest tightness 04/23/2021    Chronic kidney disease, stage 3 ([facility] [facility] 04/23/2021    History of COVID-19 04/23/2021    Hyperlipidemia     Hypertension     Intermittent palpitations 04/23/2021    Left hip pain     LVH (left ventricular hypertrophy) 05/27/2021    Near syncope 11/14/2020    Osteoarthritis     Pneumonia due to COVID-19 virus 11/15/2020    Type II or unspecified type diabetes mellitus without mention of complication, not stated as uncontrolled     Wears glasses      Past Surgical History:   Procedure Laterality Date    ABLATION OF DYSRHYTHMIC FOCUS  06/04/2021    AVNRT Ablation- CARTO; Surgeon: Aislinn Wren MD; Location: [facility] (); Service: Cardiology; Laterality: N/A;    APPENDECTOMY      CATARACT REMOVAL WITH IMPLANT Right     COLONOSCOPY N/A 05/30/2019    COLONOSCOPY POLYPECTOMY HOT BIOPSY performed by Adryan Keane MD at [address] [facility] Left     orbital wall fracture.  HAND SURGERY Right     joint replacement.  INGUINAL HERNIA REPAIR Bilateral     JOINT REPLACEMENT      total right hip Feb 2014  Dr. Nataliya Doan Left     bone shaved, and impingement.  TONSILLECTOMY AND ADENOIDECTOMY      TOTAL HIP ARTHROPLASTY Left 12/15/2021    HIP TOTAL ARTHROPLASTY performed by Марина Angela MD at [address]       Family History   Problem Relation Age of Onset    High Blood Pressure Father     Diabetes Father     Cancer Father         Skin    Heart Disease Maternal Grandfather     Diabetes Paternal Grandmother     Diabetes Paternal Grandfather         Physical Exam:  Vitals signs and nursing note reviewed. Constitutional:       Appearance: well-developed. HENT:      Head: Normocephalic and atraumatic.       Nose: Nose normal.   Eyes:      Conjunctiva/sclera: Conjunctivae normal.   Neck:      Musculoskeletal: Normal range of motion and neck supple. Pulmonary:      Effort: Pulmonary effort is normal. No respiratory distress. Musculoskeletal:      Comments: Normal gait     Skin:     General: Skin is warm and dry. Neurological:      Mental Status: Alert and oriented to person, place, and time. Sensory: No sensory deficit. Psychiatric:         Behavior: Behavior normal.         Thought Content: Thought content normal.    Wound healing well mild hematoma no obvious signs of drainage actually no signs of drainage    Provider Attestation:  Purnima Collado, personally performed the services described in this documentation. All medical record entries made by the scribe were at my direction and in my presence. I have reviewed the chart and discharge instructions and agree that the records reflect my personal performance and is accurate and complete. Xuan Christie MD 1/4/22     Scribe Attestation:  By signing my name below, Ritesh Harshal, attest that this documentation has been prepared under the direction and in the presence of Dr. Calos Poon. Electronically signed: Vesna Betancourt, 1/4/22     Please note that this chart was generated using voice recognition Dragon dictation software. Although every effort was made to ensure the accuracy of this automated transcription, some errors in transcription may have occurred.

## 2022-01-05 ENCOUNTER — HOSPITAL ENCOUNTER (OUTPATIENT)
Dept: PHYSICAL THERAPY | Age: 67
Setting detail: THERAPIES SERIES
Discharge: HOME | End: 2022-01-05
Payer: COMMERCIAL

## 2022-01-05 PROCEDURE — 97110 THERAPEUTIC EXERCISES: CPT

## 2022-01-05 NOTE — FLOWSHEET NOTE
[address] [facility] Outpatient Physical Therapy   [address]   Phone: [phone]   Fax: [phone]    Physical Therapy Daily Treatment Note      Date:  2022  Patient Name:  Leverette Homans    :  1955  MRN: [id number]  Physician: Sundar Logan MD                       Insurance: [facility]  --  [employer]   Medical Diagnosis:       M16.10 (ICD-10-CM) - Arthritis, hip      Rehab Codes: R25 pain , M25.60 stiffness, R53.1 weakness  Onset Date: 12/15/21- DOS              Next Dr.'s appt: 22 - Dr. Alfreda Monteiro  Visit# / total visits:  Cancels/No Shows: 0/0      Subjective:   Patient arrives with out [facility] with still antalgic pattern noted. He now has the dressing and staples removed. He saw surgeon who is pleased with progress/recovery      Pain:  [x] Yes  [] No Location: L hip; left anterior thigh and knee Pain Rating: (0-10 scale) 2-3/10  Pain altered Tx:  [x] No  [] Yes  Action:    Comments:    Objective:  Modalities:     PRECAUTIONS: Posterior hip precautions no flexion past 90, no adduction past midline, avoiding excessive rotation     Exercises:  Exercise Reps/ Time Weight/ Level Completed  Comments   Nustep 5' Level 2  x Seat at 13          Supine           PROM 3'   Hip flexion to 90, light hip IR/ER with hip in slight flexion    STM to hip flexor/quad 3'             SAQ* 2x15 2#  With Large bolster under knee    SL Bridge with bolster 2x15   x With large bolster under thighs &therapist supporting opposite LE off of bolster   SLR 2x15 1.5#  Cues for quad set and controlled speed   Hip adduction squeezes* X15, 5 s hold        Hamstring stretch with strap* 2x30s      Limited range noted   Hip abduction isometrics  x15 , 5s    Therapist providing resistance    sidelying clamshells x10  x Pillow btwn knees to maintain precautions   Side Lying (L) Hip Abd (pillow between knees) 15x   Assist with positioning and light assist needed as patient fatigue.             Seated        LAQ 2x15 2# x Holds 2-3 sec at end range    Seated  Hamstring stretch  2x30s   Foot propped on stool observing the 90 deg hip flexion restriction           Standing     All with BUE support in // bars    Heel raises  20x  x    Marching to 90 deg  20x 1.5# x    Mini squats  20x  x Cues for form    Lateral step (B) with light lunge  x15  x    3 way hip bilateral  15x ea  1.5# x Small range; OKA & CKC    Step ups :Fwd & lateral  15x 8\" step x 1 UE support   Gastroc stretch on slant board  3x30\"      Fwd lunge  10x   1 UE support    (B) tandem balance on foam  30\"x3 ea   x    Forward lunge rocks on step x15 ea  x    HS stretch on step  2x30\"  x    Other:  (*) indicates exercise given as HEP      Specific Instructions for next treatment: continue with light hip strengthening exercises at mat level & add standing as appropriate, hip flexor soft tissue as needed, vaso if neded. Assessment: [x] Progressing toward goals. Continued with most recent progressions with additional weight added for standing 3 way hip and marches. Added nustep for ROM and strengthening while still observing hip precautions. Completed mainly standing exercises as this provides functional stregthening and increases neuromuscular control. Progressed supine exercises to continue to challenge strength. Patient noted fatigue at end of session but overall had good tolerance to exercises and additions. [] No change. [] Other:    [x] Patient would continue to benefit from skilled physical therapy services in order to: address post-op deficits and maximize functional to his PLOF. STG: (to be met in 10 treatments)  1. Pt will self report worst pain no greater than 2/10 upon arrival to sessions in order to better tolerate ADLs/work activities with minimal dysfunction  2. Pt will improve AROM to 90 degs hip flexion in order to demonstrate ability to sit to low surfaces.   3.  Pt will be ambulate with normalized gait mechanics with LRAD to progress ambulation. 4. Pt will demonstrate 3+/5 or greater L hip ABD strength needed for hip stability in SL standing. LTG: (to be met in 20 treatments)  1. Pt will demonstrate improved L LE strength to 4/5 or greater in all major hip muscle groups in order to demonstrate improved stability/strength necessary for unrestricted ADLs/work activities  2. Pt will decrease functional limitation on HOOS JR to < 20% in order to demonstrate improved functional tolerances at PLOF with minimal restriction/dysfunction  3. Pt will be able to ambulate without AD at a gait speed of 1.0 m/s to demonstrate functional community ambulation. 4. Pt will improve ability to navigate x10 steps with R handrail in reciprocal pattern with good control and no increase in pain to allow for access to home. 5. Pt will demonstrate >10 SLS stability with light UE support to safely allow pt to enter/exit her tub shower. 6. Pt will demonstrate independence with a long term HEP for continued progress/maintenance after completion of PT       Patient Goals/Rehab Expectations: to be able to return to normal activities        Pt. Education:  [x] Yes  [] No  [x] Reviewed Prior HEP/Ed  Method of Education: [x] Verbal  [] Demo  [] Written  Comprehension of Education:  [] Verbalizes understanding. [] Demonstrates understanding. [] Needs review. [x] Demonstrates/verbalizes HEP/Ed previously given. Plan: [x] Continue per plan of care.    [] Other:      Treatment Charges: Mins Units   []  Modalities     [x]  Ther Exercise 42 3   []  Manual Therapy     []  Ther Activities     []  Aquatics     []  Neuromuscular     [] Vasocompression     [] Gait Training     [] Dry needling        [] 1 or 2 muscles        [] 3 or more muscles     []  Other     Total Treatment time 42 3     Time In: 3560 AM         Time Out:  436 AM    Electronically signed by:  Daksha Hanson PT

## 2022-01-07 ENCOUNTER — HOSPITAL ENCOUNTER (OUTPATIENT)
Dept: PHYSICAL THERAPY | Age: 67
Setting detail: THERAPIES SERIES
Discharge: HOME | End: 2022-01-07
Payer: COMMERCIAL

## 2022-01-07 PROCEDURE — 97110 THERAPEUTIC EXERCISES: CPT

## 2022-01-07 NOTE — FLOWSHEET NOTE
[facility]   [address]   Phone: [phone]   Fax: [phone]    Physical Therapy Daily Treatment Note      Date:  2022  Patient Name:  Adrianne Seip    :  1955  MRN: [id number]  Physician: Vega Schrader MD                       Insurance: [facility]  --  [employer]   Medical Diagnosis:       M16.10 (ICD-10-CM) - Arthritis, hip      Rehab Codes: R25 pain , M25.60 stiffness, R53.1 weakness  Onset Date: 12/15/21- DOS              Next Dr.'s appt: 22 - Dr. Raji Keyes  Visit# / total visits:  Cancels/No Shows: 0/0      Subjective:   Patient arrives with out [facility] with still antalgic pattern noted. He notes he was sore after last session with progressed activities. Pain:  [x] Yes  [] No Location: L hip; left anterior thigh and knee Pain Rating: (0-10 scale) 3/10  Pain altered Tx:  [x] No  [] Yes  Action:    Comments:    Objective:  Modalities:     PRECAUTIONS: Posterior hip precautions no flexion past 90, no adduction past midline, avoiding excessive rotation     Exercises:  Exercise Reps/ Time Weight/ Level Completed  Comments   Nustep 5' Level 2  x Seat at 13          Supine           PROM 3'   Hip flexion to 90, light hip IR/ER with hip in slight flexion    STM to hip flexor/quad 3'             SAQ* 2x15 2#  With Large bolster under knee    SL Bridge with bolster 2x15    With large bolster under thighs &therapist supporting opposite LE off of bolster   SLR 2x15 1.5#  Cues for quad set and controlled speed   Hip adduction squeezes* X15, 5 s hold        Hamstring stretch with strap* 2x30s      Limited range noted   Hip abduction isometrics  x15 , 5s    Therapist providing resistance    sidelying clamshells & reverse clam x15ea  x Pillow btwn knees to maintain precautions   Side Lying (L) Hip Abd (pillow between knees) 15x  x Assist with positioning and light assist needed as patient fatigue.                    Standing     All with BUE support in // bars    Heel raises  20x 1.5#  x    Marching to 90 deg  20x 1.5# x    Mini squats  20x   Cues for form    3 way lunges  x10  x    3 way hip bilateral  15x ea  1.5# x Small range; OKA & CKC    Step ups :Fwd & lateral  15x 8\" step x 1 UE support   Gastroc stretch on slant board  3x30\"      (B) tandem balance on foam  30\"x3 ea   x    Forward lunge rocks on step x15 ea  x    HS stretch on step  2x30\"  x    Other:  (*) indicates exercise given as HEP       Specific Instructions for next treatment: continue with light hip strengthening exercises at mat level & add standing as appropriate, hip flexor soft tissue as needed, vaso if neded. Assessment: [x] Progressing toward goals. Continued with most recent progressions and did not progress weight due to being sore after last visit. Did add 3 way lunges to increase glute strength in multiple directions. Observed hip Precautions throughout. No increase in pain with activities. Patient noted fatigue at end of session but overall had good tolerance to exercises and additions. [] No change. [] Other:    [x] Patient would continue to benefit from skilled physical therapy services in order to: address post-op deficits and maximize functional to his PLOF. STG: (to be met in 10 treatments)  1. Pt will self report worst pain no greater than 2/10 upon arrival to sessions in order to better tolerate ADLs/work activities with minimal dysfunction  2. Pt will improve AROM to 90 degs hip flexion in order to demonstrate ability to sit to low surfaces. 3.  Pt will be ambulate with normalized gait mechanics with LRAD to progress ambulation. 4. Pt will demonstrate 3+/5 or greater L hip ABD strength needed for hip stability in SL standing. LTG: (to be met in 20 treatments)  1.  Pt will demonstrate improved L LE strength to 4/5 or greater in all major hip muscle groups in order to demonstrate improved stability/strength necessary for unrestricted ADLs/work activities  2. Pt will decrease functional limitation on HOOS JR to < 20% in order to demonstrate improved functional tolerances at PLOF with minimal restriction/dysfunction  3. Pt will be able to ambulate without AD at a gait speed of 1.0 m/s to demonstrate functional community ambulation. 4. Pt will improve ability to navigate x10 steps with R handrail in reciprocal pattern with good control and no increase in pain to allow for access to home. 5. Pt will demonstrate >10 SLS stability with light UE support to safely allow pt to enter/exit her tub shower. 6. Pt will demonstrate independence with a long term HEP for continued progress/maintenance after completion of PT       Patient Goals/Rehab Expectations: to be able to return to normal activities        Pt. Education:  [x] Yes  [] No  [x] Reviewed Prior HEP/Ed  Method of Education: [x] Verbal  [] Demo  [] Written  Comprehension of Education:  [] Verbalizes understanding. [] Demonstrates understanding. [] Needs review. [x] Demonstrates/verbalizes HEP/Ed previously given. Plan: [x] Continue per plan of care.    [] Other:      Treatment Charges: Mins Units   []  Modalities     [x]  Ther Exercise 40 3   []  Manual Therapy     []  Ther Activities     []  Aquatics     []  Neuromuscular     [] Vasocompression     [] Gait Training     [] Dry needling        [] 1 or 2 muscles        [] 3 or more muscles     []  Other     Total Treatment time 40 3     Time In: 0800 AM         Time Out:  840 AM    Electronically signed by:  Reed Avalos, PT

## 2022-01-10 ENCOUNTER — HOSPITAL ENCOUNTER (OUTPATIENT)
Dept: PHYSICAL THERAPY | Age: 67
Setting detail: THERAPIES SERIES
Discharge: HOME | End: 2022-01-10
Payer: COMMERCIAL

## 2022-01-10 PROCEDURE — 97110 THERAPEUTIC EXERCISES: CPT

## 2022-01-10 NOTE — FLOWSHEET NOTE
with BUE support in // bars    Heel raises  20x 1.5#  x    Marching to 90 deg  20x 1.5# x 1 set EO   1 set EC    Mini squats  20x Foam  x Cues for form   No UE support    3 way lunges  10x2   x    3 way hip bilateral  15x ea  1.5# x Small range; OKA & CKC    Step ups :Fwd & lateral  15x 8\" step x 1 UE support   Gastroc stretch on slant board  3x30\"  x    (B) tandem balance on foam  30\"x3 ea   x Finger tip touch for    Forward lunge rocks on step x15 ea  x    HS stretch on step  2x30\"  x    Other:  (*) indicates exercise given as HEP       Specific Instructions for next treatment: continue with light hip strengthening exercises at mat level & add standing as appropriate, hip flexor soft tissue as needed, vaso if neded. Assessment: [x] Progressing toward goals. Patient completed all exercises to tolerance without increased pain or soreness noted at end of session. Patient required multiple cues for equal weight shifting/weight bearing through (B) LE's while completing exercises in order to challenge LLE. Cues required for correct exercise technique and to refrain from compensating using momentum especially with step ups and forward lunges. Challenged patients proprioceptive awareness with addition of foam during mini squats,and completing marches with eyes closed this date with good tolerance, minimal UE support required to refrain from instability. [] No change. [] Other:    [x] Patient would continue to benefit from skilled physical therapy services in order to: address post-op deficits and maximize functional to his PLOF. STG: (to be met in 10 treatments)  1. Pt will self report worst pain no greater than 2/10 upon arrival to sessions in order to better tolerate ADLs/work activities with minimal dysfunction  2. Pt will improve AROM to 90 degs hip flexion in order to demonstrate ability to sit to low surfaces.   3.  Pt will be ambulate with normalized gait mechanics with LRAD to progress ambulation. 4. Pt will demonstrate 3+/5 or greater L hip ABD strength needed for hip stability in SL standing. LTG: (to be met in 20 treatments)  1. Pt will demonstrate improved L LE strength to 4/5 or greater in all major hip muscle groups in order to demonstrate improved stability/strength necessary for unrestricted ADLs/work activities  2. Pt will decrease functional limitation on HOOS JR to < 20% in order to demonstrate improved functional tolerances at PLOF with minimal restriction/dysfunction  3. Pt will be able to ambulate without AD at a gait speed of 1.0 m/s to demonstrate functional community ambulation. 4. Pt will improve ability to navigate x10 steps with R handrail in reciprocal pattern with good control and no increase in pain to allow for access to home. 5. Pt will demonstrate >10 SLS stability with light UE support to safely allow pt to enter/exit her tub shower. 6. Pt will demonstrate independence with a long term HEP for continued progress/maintenance after completion of PT       Patient Goals/Rehab Expectations: to be able to return to normal activities        Pt. Education:  [x] Yes  [] No  [x] Reviewed Prior HEP/Ed  Method of Education: [x] Verbal  [] Demo  [] Written  Comprehension of Education:  [] Verbalizes understanding. [] Demonstrates understanding. [] Needs review. [x] Demonstrates/verbalizes HEP/Ed previously given. Plan: [x] Continue per plan of care.    [] Other:      Treatment Charges: Mins Units   []  Modalities     [x]  Ther Exercise 40 3   []  Manual Therapy     []  Ther Activities     []  Aquatics     []  Neuromuscular     [] Vasocompression     [] Gait Training     [] Dry needling        [] 1 or 2 muscles        [] 3 or more muscles     []  Other     Total Treatment time 40 3     Time In: 0805 AM         Time Out:  719 AM    Electronically signed by:  Sonia Snowden PTA

## 2022-01-12 ENCOUNTER — HOSPITAL ENCOUNTER (OUTPATIENT)
Dept: PHYSICAL THERAPY | Age: 67
Setting detail: THERAPIES SERIES
Discharge: HOME | End: 2022-01-12
Payer: COMMERCIAL

## 2022-01-12 PROCEDURE — 97140 MANUAL THERAPY 1/> REGIONS: CPT

## 2022-01-12 PROCEDURE — 97110 THERAPEUTIC EXERCISES: CPT

## 2022-01-12 NOTE — FLOWSHEET NOTE
[facility]   [address]   Phone: [phone]   Fax: [phone]    Physical Therapy Daily Treatment Note      Date:  2022  Patient Name:  Scott Sanchez    :  1955  MRN: [id number]  Physician: Jaqueline Cooper MD                       Insurance: [facility]  --  [employer]   Medical Diagnosis:       M16.10 (ICD-10-CM) - Arthritis, hip      Rehab Codes: R25 pain , M25.60 stiffness, R53.1 weakness  Onset Date: 12/15/21- DOS              Next Dr.'s appt: 22 - Dr. Carleene Gosselin  Visit# / total visits:  Cancels/No Shows: 0/0      Subjective:   Patient reporting he went back to work on Monday, 1/10/22, and states he is feeling very sore this session. Pain:  [x] Yes  [] No Location: L hip; left anterior thigh and knee Pain Rating: (0-10 scale) 4/10  Pain altered Tx:  [x] No  [] Yes  Action:    Comments:    Objective:  Modalities:     PRECAUTIONS: Posterior hip precautions no flexion past 90, no adduction past midline, avoiding excessive rotation     Exercises:  Exercise Reps/ Time Weight/ Level Completed  Comments   Nustep 5' Level 2  x Seat at 13          Supine           PROM 3'   Hip flexion to 90, light hip IR/ER with hip in slight flexion    STM to hip flexor/quad 8'  x : IASTM AND STM          SAQ* 2x15 2#  With Large bolster under knee    SL Bridge with bolster 2x15    With large bolster under thighs &therapist supporting opposite LE off of bolster   SLR 2x15 1.5#  Cues for quad set and controlled speed   Hip adduction squeezes* X15, 5 s hold        Hamstring stretch with strap* 2x30s      Limited range noted   Hip abduction isometrics  x15 , 5s    Therapist providing resistance    sidelying clamshells & reverse clam x15ea   Pillow btwn knees to maintain precautions   Side Lying (L) Hip Abd (pillow between knees) 15x   Assist with positioning and light assist needed as patient fatigue.      Prone quad stretch 3x 30\"  x  therapist OP provided for HEP this session          Standing     All with BUE support in // bars    Heel raises  20x 1.5#  x    Marching to 90 deg  20x 1.5# x 1 set EO   1 set EC    Mini squats  20x Foam  x Cues for form   No UE support    3 way lunges  10x2       3 way hip bilateral  15x ea  1.5# x Small range; OKA & CKC    Step ups :Fwd & lateral  15x 8\" step  1 UE support   Gastroc stretch on slant board  3x30\"      (B) tandem balance on foam  30\"x3 ea   x Finger tip touch for 1/12 2x this session   SL balance on FIRM ground 3x 30\"  x Added 1/12 finger tip support as needed with SLS on LLE. Forward lunge rocks on step x15 ea      HS stretch on step  2x30\"      Resisted walking Lateral 3 laps red x Added 1/12   Monster walks 2 laps red x Fwd/retro added 1/12                        Other:  (*) indicates exercise given as HEP       Specific Instructions for next treatment: continue with light hip strengthening exercises at mat level & add standing as appropriate, hip flexor soft tissue as needed, vaso if neded. Assessment: [x] Progressing toward goals. 1/12: 4 weeks post op; Began session on NuStep and followed with manual therapy to L quad to decrease tightness and discomfort in LLE. Added prone quad stretch and educated patient on and provided patient with quad stretch/hip flexor stretch to be able to perform while at work to decrease tightness and improve mobility. Added resisted walking lateral and monster walks without increase in pain or difficulty. Patient reporting improved tolerance to activities with manual performed at the beginning of session stating he doesn't feel as tight as when he came in. He feared PT would cause increase in pain this session due to how tight his LLE has been feeling. Frequent cueing needed throughout session to correct patient's standing posture and to ensure glut activation in standing to increase hip stability. [] No change.       [] Other:    [x] Patient would continue to benefit from skilled physical therapy services in order to: address post-op deficits and maximize functional to his PLOF. STG: (to be met in 10 treatments)  1. Pt will self report worst pain no greater than 2/10 upon arrival to sessions in order to better tolerate ADLs/work activities with minimal dysfunction  2. Pt will improve AROM to 90 degs hip flexion in order to demonstrate ability to sit to low surfaces. 3.  Pt will be ambulate with normalized gait mechanics with LRAD to progress ambulation. 4. Pt will demonstrate 3+/5 or greater L hip ABD strength needed for hip stability in SL standing. LTG: (to be met in 20 treatments)  1. Pt will demonstrate improved L LE strength to 4/5 or greater in all major hip muscle groups in order to demonstrate improved stability/strength necessary for unrestricted ADLs/work activities  2. Pt will decrease functional limitation on HOOS JR to < 20% in order to demonstrate improved functional tolerances at PLOF with minimal restriction/dysfunction  3. Pt will be able to ambulate without AD at a gait speed of 1.0 m/s to demonstrate functional community ambulation. 4. Pt will improve ability to navigate x10 steps with R handrail in reciprocal pattern with good control and no increase in pain to allow for access to home. 5. Pt will demonstrate >10 SLS stability with light UE support to safely allow pt to enter/exit her tub shower. 6. Pt will demonstrate independence with a long term HEP for continued progress/maintenance after completion of PT       Patient Goals/Rehab Expectations: to be able to return to normal activities        Pt. Education:  [x] Yes  [] No  [x] Reviewed Prior HEP/Ed  Method of Education: [x] Verbal  [] Demo  [] Written  Comprehension of Education:  [] Verbalizes understanding. [] Demonstrates understanding. [] Needs review. [x] Demonstrates/verbalizes HEP/Ed previously given. Plan: [x] Continue per plan of care.    [] Other:      Treatment Charges: Mins Units   []  Modalities     [x]  Ther Exercise 37 2   [x]  Manual Therapy 8 1   []  Ther Activities     []  Aquatics     []  Neuromuscular     [] Vasocompression     [] Gait Training     [] Dry needling        [] 1 or 2 muscles        [] 3 or more muscles     []  Other     Total Treatment time 45 3     Time In: 0800         Time Out:  0845     Electronically signed by:  Ivis Murphy PTA

## 2022-01-14 ENCOUNTER — HOSPITAL ENCOUNTER (OUTPATIENT)
Dept: PHYSICAL THERAPY | Age: 67
Setting detail: THERAPIES SERIES
Discharge: HOME | End: 2022-01-14
Payer: COMMERCIAL

## 2022-01-14 PROCEDURE — 97140 MANUAL THERAPY 1/> REGIONS: CPT

## 2022-01-14 PROCEDURE — 97110 THERAPEUTIC EXERCISES: CPT

## 2022-01-14 NOTE — PROGRESS NOTES
[facility]   [facility] [address] [place], [place] [address] [address]   Phone: [phone]   Fax: [phone]    Physical Therapy Daily Treatment Note/Progress Note      Date:  2022  Patient Name:  Beatriz Marshall    :  1955  MRN: [id number]  Physician: Mala Jason MD                       Insurance: [facility]  --  [employer]   Medical Diagnosis:       M16.10 (ICD-10-CM) - Arthritis, hip      Rehab Codes: R25 pain , M25.60 stiffness, R53.1 weakness  Onset Date: 12/15/21- DOS              Next Dr.'s appt: 22 - Dr. Paula Stearns  Visit# / total visits: 10/20 Cancels/No Shows: 0/0   Date of initial eval: 21       PN: 22    Formal reporting period: 21 -22      Subjective:   Pt states he is feeling better in terms of quad tightness since last session and manual work. Notes he is doing quad stretch after he gets up from desk. This has improved his sleep. Notes he is doing better with bending over toe tie shoes on a step and washing lower body. Still unable to tie shoes as he normally would. Still walking slightly slower than his normal pace.     Pain:  [x] Yes  [] No Location: L hip; left anterior thigh and knee Pain Rating: (0-10 scale) 1/10  Pain altered Tx:  [x] No  [] Yes  Action:    Comments:    Objective:   updated 22  Range of Motion  Left Range of Motion  Right Strength  Left Strength  Right   Hip Flexion A: 70 (SLR) & 100 with bent knee   P:75-80 110 4 5   Hip Abduction 35   4 5   Hip Adduction Did not d/t precautions   4- 5   Hip Extension ~15 deg   4+ 5   Hip ER 20 15-20 4 5   Hip IR 15 10-15 5 5   Knee Flexion WNL for all below WNL for all below 5 5   Knee Extension     5 5   Dorsiflexion     5 5   Plantar flexion     5 5   Inversion     5 5   Eversion     5 5         PRECAUTIONS: Posterior hip precautions no flexion past 90, no adduction past midline, avoiding excessive rotation     Exercises:  Exercise Reps/ Time Weight/ Level Completed  Comments   Nustep 5' Level 2   Seat at 13          Supine           PROM 3'   Hip flexion to 90, light hip IR/ER with hip in slight flexion    STM to hip flexor/quad 6'  x    Supine modified kain stretch 2x30s  x Minimal hip extension focusing on gaining more knee flexion with overpressure   SAQ* 2x15 2#  With Large bolster under knee    SL Bridge with bolster 2x15    With large bolster under thighs &therapist supporting opposite LE off of bolster   SLR 2x15 1.5#  Cues for quad set and controlled speed   Hip adduction squeezes* X15, 5 s hold        Hamstring stretch with strap* 2x30s      Limited range noted   Hip abduction isometrics  x15 , 5s    Therapist providing resistance    sidelying clamshells & reverse clam x15ea   Pillow btwn knees to maintain precautions   Side Lying (L) Hip Abd (pillow between knees) 15x   Assist with positioning and light assist needed as patient fatigue. Prone quad stretch 3x 30\"   1/12 therapist OP provided for HEP this session          Standing     All with BUE support in // bars    Heel raises  20x 1.5#      Marching to 90 deg  20x foam x No UE supoport    Mini squats  20x Foam  x Cues for form   No UE support    3 way lunges  10x2       4 way hip bilateral* 15x ea  Red  x For adduction moved into max ABD and brings in to neutral    Step ups :Fwd & lateral  15x 8\" step  1 UE support   Gastroc stretch on slant board  3x30\"      (B) tandem balance on foam  30\"x3 ea   x Finger tip touch for 1/12 2x this session   SL balance on FIRM ground 3x 30\"   Added 1/12 finger tip support as needed with SLS on LLE.    Forward lunge rocks on step x15 ea      HS stretch on step  2x30\"  x    Resisted walking Lateral 3 laps red x    Monster walks: F/B 2 laps red x                         Other:  (*) indicates exercise given as HEP       Specific Instructions for next treatment: manual work to quad and hip flexor (STM or roller) to begin session, standing strengthening, work on foam to build jt proprioception, stair training       Assessment: [x] Progressing toward goals. Pt was initially evaluated on 12/20/21 s/p L MONICA. He is now ~4.5 weeks post op and progressing well in terms of recovery. Today assessed ROM and strength showing improvements in both measures as expected. L hip strength still demonstrating some weakness that limits SL stability needed for stairs and ambulation. Pt was also having quad tenderness/tightness that has been improving this week with added manual work targeting these areas. Continued with this in session prior to exercises as pt feels he is more successful and having less tightness allowing for progression. Progressed home program with addition of 4 way resisted hip. Provided with red theraband to use at home. Continued to build proprioceptive sense in the L hip with work on foam and will continue to progress. Overall pt is progressing well with mobility and making progress toward all goals. [] No change. [] Other:    [x] Patient would continue to benefit from skilled physical therapy services in order to: address post-op deficits and maximize functional to his PLOF. STG: (to be met in 10 treatments)  1. Pt will self report worst pain no greater than 2/10 upon arrival to sessions in order to better tolerate ADLs/work activities with minimal dysfunction (MET 1/14)  2. Pt will improve AROM to 90 degs hip flexion in order to demonstrate ability to sit to low surfaces. (MET 1/14- achieves when hamstring is not affecting)  3. Pt will be ambulate with normalized gait mechanics with LRAD to progress ambulation. (MET 1/14 when using 2WW; still having mild gait deviations without AD)  4. Pt will demonstrate 3+/5 or greater L hip ABD strength needed for hip stability in SL standing. (MET 1/14)  LTG: (to be met in 20 treatments)  1.  Pt will demonstrate improved L LE strength to 4/5 or greater in all major hip muscle groups in order to demonstrate improved stability/strength necessary for unrestricted ADLs/work activities (Progressing)  2. Pt will decrease functional limitation on HOOS JR to < 20% in order to demonstrate improved functional tolerances at PLOF with minimal restriction/dysfunction (will assess at later session)  3. Pt will be able to ambulate without AD at a gait speed of 1.0 m/s to demonstrate functional community ambulation. (progressing)  4. Pt will improve ability to navigate x10 steps with R handrail in reciprocal pattern with good control and no increase in pain to allow for access to home. (progressing)  5. Pt will demonstrate >10 SLS stability with light UE support to safely allow pt to enter/exit her tub shower. (progressing)  6. Pt will demonstrate independence with a long term HEP for continued progress/maintenance after completion of PT (updating as pt progresses)       Patient Goals/Rehab Expectations: to be able to return to normal activities        Pt. Education:  [x] Yes  [] No  [x] Reviewed Prior HEP/Ed  Method of Education: [x] Verbal  [] Demo  [x] Written  Comprehension of Education:  [x] Verbalizes understanding. [x] Demonstrates understanding. [] Needs review. [x] Demonstrates/verbalizes HEP/Ed previously given. Plan: [x] Continue per plan of care.    [] Other:      Treatment Charges: Mins Units   []  Modalities     [x]  Ther Exercise 36 2   [x]  Manual Therapy 8 1   []  Ther Activities     []  Aquatics     []  Neuromuscular     [] Vasocompression     [] Gait Training     [] Dry needling        [] 1 or 2 muscles        [] 3 or more muscles     []  Other     Total Treatment time 44 3     Time In: 0801         Time Out:  0845     Electronically signed by:  Carlyn Tierney, PT

## 2022-01-17 ENCOUNTER — HOSPITAL ENCOUNTER (OUTPATIENT)
Dept: PHYSICAL THERAPY | Age: 67
Setting detail: THERAPIES SERIES
Discharge: HOME | End: 2022-01-17
Payer: COMMERCIAL

## 2022-01-17 PROCEDURE — 97110 THERAPEUTIC EXERCISES: CPT

## 2022-01-17 PROCEDURE — 97140 MANUAL THERAPY 1/> REGIONS: CPT

## 2022-01-17 NOTE — FLOWSHEET NOTE
[facility]   [address]   Phone: [phone]   Fax: [phone]    Physical Therapy Daily Treatment Note      Date:  2022  Patient Name:  Adrianne Seip    :  1955  MRN: [id number]  Physician: Vega Schrader MD                       Insurance: [facility]  --  [employer]   Medical Diagnosis:       M16.10 (ICD-10-CM) - Arthritis, hip      Rehab Codes: R25 pain , M25.60 stiffness, R53.1 weakness  Onset Date: 12/15/21- DOS              Next Dr.'s appt: 22 - Dr. Raji Keyes  Visit# / total visits:  Cancels/No Shows: 0/0    Subjective:   Doing ok with return to work. States he has been trying to walk a lot vs sitting. Denies groin pain. Still has trouble getting sock on- can not reach foot; able to now tie shoe but still causes some pain. Reports he has been working on stairs foot over foot  Pain:  [x] Yes  [] No Location: L  left anterior/lateral thigh and knee Pain Rating: (0-10 scale) 1-2/10  Pain altered Tx:  [x] No  [] Yes  Action:    Comments:    Objective:  Modalities:       PRECAUTIONS: Posterior hip precautions no flexion past 90, no adduction past midline, avoiding excessive rotation     Exercises:  Exercise Reps/ Time Weight/ Level Completed  Comments   Nustep 5' Level 2    Seat at 15               Supine                   STM to hip flexor/quad 9'   x  Roller stick   Supine modified kain stretch 3x30s   x Minimal hip extension focusing on gaining more knee flexion with overpressure                      Standing        All with BUE support in // bars             Marching to 90 deg  20x foam x No UE supoport    Mini squats  20x Foam  x Cues for form   No UE support    3 way lunges  10x2          4 way hip bilateral* 15x ea  Red  x For adduction moved into max ABD and brings in to neutral    Step ups :Fwd & lateral  15x 8\" step  x 1 UE support   Step Downs F/L 10x 4\" step X Light UE support;  Added 22   Gastroc stretch on slant board  3x30\"         (B) tandem balance on foam  30\"x3 ea    x    SL balance on FIRM ground 3x 30\"        Forward lunge rocks on step x15 ea         HS stretch on step  2x30\"  8\" x     Resisted walking Lateral 3 laps red      Monster walks: F/B 2 laps red                  Fitter Board- Rock & Balance: S/S And F/B Staggered  30\" each   X  Added 1/17/22               Other:  (*) indicates exercise given as HEP         Specific Instructions for next treatment: Continue manual work to quad and hip flexor (STM or roller) to begin session, standing strengthening, work on foam to build jt proprioception, stair training       Assessment: [x] Progressing toward goals. Continued program with manual work to left quad and hip flexor with use of roller prior to exercise followed by manual stretching to hip flexor. Progressed program with balance activity challenging on uneven surfaces and less UE support. Added eccentric quad control activity with 4\" step- some difficulty noted but patient offered good effort. Also added fitter board to challenge control and proprioceptive input. Patient noted good relief post program- less tightness noted in left hip    [] No change. [] Other:  Patient would continue to benefit from skilled physical therapy services in order to: address post-op deficits and maximize functional to his PLOF.     STG: (to be met in 10 treatments)  1. Pt will self report worst pain no greater than 2/10 upon arrival to sessions in order to better tolerate ADLs/work activities with minimal dysfunction (MET 1/14)  2. Pt will improve AROM to 90 degs hip flexion in order to demonstrate ability to sit to low surfaces. (MET 1/14- achieves when hamstring is not affecting)  3.  Pt will be ambulate with normalized gait mechanics with LRAD to progress ambulation. (MET 1/14 when using 2WW; still having mild gait deviations without AD)  4.  Pt will demonstrate 3+/5 or greater L hip ABD strength needed for hip stability in SL standing. (MET 1/14)  LTG: (to be met in 20 treatments)  1. Pt will demonstrate improved L LE strength to 4/5 or greater in all major hip muscle groups in order to demonstrate improved stability/strength necessary for unrestricted ADLs/work activities (Progressing)  2. Pt will decrease functional limitation on HOOS JR to < 20% in order to demonstrate improved functional tolerances at PLOF with minimal restriction/dysfunction (will assess at later session)  3. Pt will be able to ambulate without AD at a gait speed of 1.0 m/s to demonstrate functional community ambulation. (progressing)  4. Pt will improve ability to navigate x10 steps with R handrail in reciprocal pattern with good control and no increase in pain to allow for access to home. (progressing)  5. Pt will demonstrate >10 SLS stability with light UE support to safely allow pt to enter/exit her tub shower. (progressing)  6. Pt will demonstrate independence with a long term HEP for continued progress/maintenance after completion of PT (updating as pt progresses)        Patient Goals/Rehab Expectations: to be able to return to normal activities        Pt. Education:  [x] Yes  [] No  [x] Reviewed Prior HEP/Ed  Method of Education: [x] Verbal  [] Demo  [] Written  Comprehension of Education:  [] Verbalizes understanding. [] Demonstrates understanding. [] Needs review. [x] Demonstrates/verbalizes HEP/Ed previously given. Plan: [x] Continue per plan of care.    [] Other:      Treatment Charges: Mins Units   []  Modalities     [x]  Ther Exercise 31 2   [x]  Manual Therapy 10 1   []  Ther Activities     []  Aquatics     []  Neuromuscular     [] Vasocompression     [] Gait Training     [] Dry needling        [] 1 or 2 muscles        [] 3 or more muscles     []  Other     Total Treatment time 41 3     Time In: 750 AM        Time Out:  881 AM    Electronically signed by:  Jorge A Bell PTA

## 2022-01-18 NOTE — OP NOTE
[address]                 [address] [facility], [address]                                OPERATIVE REPORT    PATIENT NAME: Bigg Noguera                    :        1955  MED REC NO:   [id number]                              ROOM:         ACCOUNT NO:   [de-identified]                           ADMIT DATE: 12/15/2021  PROVIDER:     Keith Leblanc    ADDENDUM    DATE OF PROCEDURE:  12/15/2021    Qualified resident was not available for the procedure.         RUBY Lee    D: 2022 12:10:22       T: 2022 13:02:03     [email or web address]  Job#: [id number]     Doc#: [id number]    CC:

## 2022-01-19 ENCOUNTER — HOSPITAL ENCOUNTER (OUTPATIENT)
Dept: PHYSICAL THERAPY | Age: 67
Setting detail: THERAPIES SERIES
Discharge: HOME | End: 2022-01-19
Payer: COMMERCIAL

## 2022-01-19 PROCEDURE — 97110 THERAPEUTIC EXERCISES: CPT

## 2022-01-19 NOTE — FLOWSHEET NOTE
[facility]   [address]   Phone: [phone]   Fax: [phone]    Physical Therapy Daily Treatment Note      Date:  2022  Patient Name:  Sang Mills    :  1955  MRN: [id number]  Physician: Gris aHn MD                       Insurance: [facility]  --  [employer]   Medical Diagnosis:       M16.10 (ICD-10-CM) - Arthritis, hip      Rehab Codes: R25 pain , M25.60 stiffness, R53.1 weakness  Onset Date: 12/15/21- DOS              Next 's appt: 22 - Dr. Adrienne Kinney  Visit# / total visits:  Cancels/No Shows: 0/0    Subjective:   Patient states he has difficulty bending over to put his sock on but has not other complaints. Pain:  [] Yes  [x] No Location: L  left anterior/lateral thigh and knee Pain Rating: (0-10 scale) denies/10  Pain altered Tx:  [x] No  [] Yes  Action:    Comments:    Objective:  Modalities:       PRECAUTIONS: Posterior hip precautions no flexion past 90, no adduction past midline, avoiding excessive rotation     Exercises:  Exercise Reps/ Time Weight/ Level Completed  Comments   Nustep 5' Level 2  x Seat at 13               Supine                   STM to hip flexor/quad 5'   x  Roller stick   Supine modified kain stretch 3x30s    Minimal hip extension focusing on gaining more knee flexion with overpressure                      Standing        All with BUE support in // bars    Heel raises  20x  foam x     Marching to 90 deg  20x 2\" hold foam x Finger tip support as needed   Mini squats  20x Foam  x Cues for form   No UE support    3 way lunges  10x2    x   10x only   4 way hip bilateral* 15x ea  Red  x For adduction moved into max ABD and brings in to neutral    Step ups :Fwd & lateral  15x 8\" step x No UE support   Step Downs F/L 10x 4\" step  Light UE support;  Added 22   Gastroc stretch on slant board  3x30\"        (B) tandem balance on foam  30\"x3 ea        SL balance on FIRM ground 3x 30\"        Forward lunge rocks on step x15 ea         HS stretch on step  2x30\"  8\"      Resisted walking Lateral 3 laps red      Monster walks: F/B 2 laps red                  Fitter Board- Rock & Balance: S/S And F/B Staggered  30\" each     Added 1/17/22               Other:  (*) indicates exercise given as HEP         Specific Instructions for next treatment: Continue manual work to quad and hip flexor (STM or roller) to begin session, standing strengthening, work on foam to build jt proprioception, stair training, resisted walking       Assessment: [x] Progressing toward goals. Patient presenting to session with decreased tension in L quad/hip and improved mobility. Began session with NuStep and continued with standing strengthening exercises this session. Cues as needed to maintain equal WB through BLEs and for proper posture and technique during standing exercises. [] No change. [] Other:  Patient would continue to benefit from skilled physical therapy services in order to: address post-op deficits and maximize functional to his PLOF.     STG: (to be met in 10 treatments)  1. Pt will self report worst pain no greater than 2/10 upon arrival to sessions in order to better tolerate ADLs/work activities with minimal dysfunction (MET 1/14)  2. Pt will improve AROM to 90 degs hip flexion in order to demonstrate ability to sit to low surfaces. (MET 1/14- achieves when hamstring is not affecting)  3.  Pt will be ambulate with normalized gait mechanics with LRAD to progress ambulation. (MET 1/14 when using 2WW; still having mild gait deviations without AD)  4. Pt will demonstrate 3+/5 or greater L hip ABD strength needed for hip stability in SL standing. (MET 1/14)  LTG: (to be met in 20 treatments)  1. Pt will demonstrate improved L LE strength to 4/5 or greater in all major hip muscle groups in order to demonstrate improved stability/strength necessary for unrestricted ADLs/work activities (Progressing)  2.  Pt will decrease functional limitation on HOOS JR to < 20% in order to demonstrate improved functional tolerances at PLOF with minimal restriction/dysfunction (will assess at later session)  3. Pt will be able to ambulate without AD at a gait speed of 1.0 m/s to demonstrate functional community ambulation. (progressing)  4. Pt will improve ability to navigate x10 steps with R handrail in reciprocal pattern with good control and no increase in pain to allow for access to home. (progressing)  5. Pt will demonstrate >10 SLS stability with light UE support to safely allow pt to enter/exit her tub shower. (progressing)  6. Pt will demonstrate independence with a long term HEP for continued progress/maintenance after completion of PT (updating as pt progresses)        Patient Goals/Rehab Expectations: to be able to return to normal activities        Pt. Education:  [x] Yes  [] No  [x] Reviewed Prior HEP/Ed  Method of Education: [x] Verbal  [] Demo  [] Written  Comprehension of Education:  [] Verbalizes understanding. [] Demonstrates understanding. [] Needs review. [x] Demonstrates/verbalizes HEP/Ed previously given. Plan: [x] Continue per plan of care.    [] Other:      Treatment Charges: Mins Units   []  Modalities     [x]  Ther Exercise 40 3   [x]  Manual Therapy 5 -   []  Ther Activities     []  Aquatics     []  Neuromuscular     [] Vasocompression     [] Gait Training     [] Dry needling        [] 1 or 2 muscles        [] 3 or more muscles     []  Other     Total Treatment time 45 3     Time In: 0800       Time Out:  0845    Electronically signed by:  Belkis Newell PTA

## 2022-01-24 ENCOUNTER — HOSPITAL ENCOUNTER (OUTPATIENT)
Dept: PHYSICAL THERAPY | Age: 67
Setting detail: THERAPIES SERIES
Discharge: HOME | End: 2022-01-24
Payer: COMMERCIAL

## 2022-01-24 PROCEDURE — 97110 THERAPEUTIC EXERCISES: CPT

## 2022-01-24 NOTE — FLOWSHEET NOTE
509 [facility]   [address]   Phone: [phone]   Fax: [phone]    Physical Therapy Daily Treatment Note      Date:  2022  Patient Name:  Hetal Atkinson    :  1955  MRN: [id number]  Physician: Filippo Gonzalez MD                       Insurance: [facility]  --  [employer]   Medical Diagnosis:       M16.10 (ICD-10-CM) - Arthritis, hip      Rehab Codes: R25 pain , M25.60 stiffness, R53.1 weakness  Onset Date: 12/15/21- DOS              Next 's appt: 22 - Dr. Felton Carlos  Visit# / total visits:  Cancels/No Shows: 0/0    Subjective:   Still has difficulty putting socks on- able to get shoe on. Able to perform stairs foot over foot. Walking well overall- went to a [employer] and had no issues. Tenderness at left anterior thigh and knee but denies pain. Patient requesting DC next visit due to insurance limitations/deductible.  Reports he stretches hamstring and quad a lot while at work  Pain:  [] Yes  [x] No Location: L  left anterior/lateral thigh and knee Pain Rating: (0-10 scale) denies/10  Pain altered Tx:  [x] No  [] Yes  Action:    Comments:    Objective:  Modalities:     PRECAUTIONS: Posterior hip precautions no flexion past 90, no adduction past midline, avoiding excessive rotation     Exercises:  Exercise Reps/ Time Weight/ Level Completed  Comments   Supine                   STM to hip flexor/quad 5'   x  Roller stick   Supine modified kain stretch 2x30s   x Minimal hip extension focusing on gaining more knee flexion with overpressure                      Standing        All with BUE support in // bars    Heel raises  20x  foam      Marching to 90 deg  20x 2\" hold foam  Finger tip support as needed   Mini squats  20x Foam  x *Cues for form   No UE support    Grid Lunges (B) LEs 10x   x *   4 way hip bilateral* 15x ea  Red  x * For adduction moved into max ABD and brings in to neutral    Step ups :Fwd & lateral  15x 8\" step x * No UE support   Step Downs F/L 12x 6\" step x * 1 UE Support   Gastroc stretch on slant board  3x30\"        (B) tandem balance on foam  30\"x3 ea        SL (L) LE balance on FOAM 3x 20\"    X *   Forward lunge rocks on step x15 ea         HS stretch on step  2x30\"  8\" x *   Resisted walking Lateral 3 laps red x  *   Monster walks: F/B 2 laps red x  *               Fitter Board- Rock & Balance: S/S And F/B Staggered  30\" each   x                Other:  (*) indicates exercise given as HEP 1/24/22        Specific Instructions for next treatment: Review HEP in prep for DC next visit     Assessment: [x] Progressing toward goals. Continued to focus on functional mobility, balance and stair training. Encouraged less UE support to build LE strength; provided patient with written HEP in prep for DC. Patient still demonstrates some difficulty with eccentric quad control lowering down step at 6\". Offers good effort. Minimal tenderness noted throughout left quad/hip    [] No change. [] Other:  Patient would continue to benefit from skilled physical therapy services in order to: address post-op deficits and maximize functional to his PLOF.     STG: (to be met in 10 treatments)  1. Pt will self report worst pain no greater than 2/10 upon arrival to sessions in order to better tolerate ADLs/work activities with minimal dysfunction (MET 1/14)  2. Pt will improve AROM to 90 degs hip flexion in order to demonstrate ability to sit to low surfaces. (MET 1/14- achieves when hamstring is not affecting)  3.  Pt will be ambulate with normalized gait mechanics with LRAD to progress ambulation. (MET 1/14 when using 2WW; still having mild gait deviations without AD)  4. Pt will demonstrate 3+/5 or greater L hip ABD strength needed for hip stability in SL standing. (MET 1/14)  LTG: (to be met in 20 treatments)  1.  Pt will demonstrate improved L LE strength to 4/5 or greater in all major hip muscle groups in order to demonstrate improved stability/strength necessary for unrestricted ADLs/work activities (Progressing)  2. Pt will decrease functional limitation on HOOS JR to < 20% in order to demonstrate improved functional tolerances at PLOF with minimal restriction/dysfunction (will assess at later session)  3. Pt will be able to ambulate without AD at a gait speed of 1.0 m/s to demonstrate functional community ambulation. (progressing)  4. Pt will improve ability to navigate x10 steps with R handrail in reciprocal pattern with good control and no increase in pain to allow for access to home. (progressing)  5. Pt will demonstrate >10 SLS stability with light UE support to safely allow pt to enter/exit her tub shower. (progressing)  6. Pt will demonstrate independence with a long term HEP for continued progress/maintenance after completion of PT (updating as pt progresses)        Patient Goals/Rehab Expectations: to be able to return to normal activities        Pt. Education:  [x] Yes  [] No  [x] Reviewed Prior HEP/Ed  Method of Education: [x] Verbal  [] Demo  [x] Written  Comprehension of Education: Issued HEP as noted above; Copy in chart; Red T- [employer] issued  [x] Verbalizes understanding. [x] Demonstrates understanding. [] Needs review. [] Demonstrates/verbalizes HEP/Ed previously given. Plan: [x] Continue per plan of care.    [] Other:      Treatment Charges: Mins Units   []  Modalities     [x]  Ther Exercise 39 3   [x]  Manual Therapy 5 -   []  Ther Activities     []  Aquatics     []  Neuromuscular     [] Vasocompression     [] Gait Training     [] Dry needling        [] 1 or 2 muscles        [] 3 or more muscles     []  Other     Total Treatment time 44 3     Time In: 631 AM       Time Out:  758 AM    Electronically signed by:  Bianca Sanchez PTA

## 2022-01-26 ENCOUNTER — HOSPITAL ENCOUNTER (OUTPATIENT)
Dept: PHYSICAL THERAPY | Age: 67
Setting detail: THERAPIES SERIES
Discharge: HOME | End: 2022-01-26
Payer: COMMERCIAL

## 2022-01-31 ENCOUNTER — APPOINTMENT (OUTPATIENT)
Dept: PHYSICAL THERAPY | Age: 67
End: 2022-01-31
Payer: COMMERCIAL

## 2022-02-01 ENCOUNTER — OFFICE VISIT (OUTPATIENT)
Dept: ORTHOPEDIC SURGERY | Age: 67
End: 2022-02-01

## 2022-02-01 VITALS — RESPIRATION RATE: 14 BRPM | WEIGHT: 207 LBS | HEIGHT: 69 IN | BODY MASS INDEX: 30.66 KG/M2

## 2022-02-01 DIAGNOSIS — Z96.642 S/P TOTAL LEFT HIP ARTHROPLASTY: Primary | ICD-10-CM

## 2022-02-01 PROCEDURE — 99024 POSTOP FOLLOW-UP VISIT: CPT | Performed by: ORTHOPAEDIC SURGERY

## 2022-02-01 RX ORDER — PIOGLITAZONEHYDROCHLORIDE 30 MG/1
30 TABLET ORAL DAILY
COMMUNITY
Start: 2022-01-28

## 2022-02-01 NOTE — PROGRESS NOTES
Patient ID: Dionna Farah is a 73 y.o. male    Chief Compliant:  Chief Complaint   Patient presents with    Post-Op Check     Lt MONICA 12/15/21        Diagnostic imaging:        Assessment and Plan:  1. S/P total left hip arthroplasty      6 weeks post L MONICA only mild pain at this time    Follow up 3 months with new x-rays    HPI:  This is a 73 y.o. male who presents to the clinic today for 6 weeks post  L MONICA 12/15/21. Patient continues to recover well post operatively with significant improvement, he notes only mild pain. He has been undergoing PT. He is ambulating unassisted    Review of Systems   All other systems reviewed and are negative. Past History:    Current Outpatient Medications:     aspirin EC 81 MG EC tablet, Take 1 tablet by mouth 2 times daily, Disp: 90 tablet, Rfl: 0    Dulaglutide (TRULICITY) 1.5 GY/3.9TD SOPN, Inject 1.5 mg into the skin once a week Sunday's, Disp: , Rfl:     Multiple Vitamins-Minerals (THERAPEUTIC MULTIVITAMIN-MINERALS) tablet, Take 1 tablet by mouth daily, Disp: , Rfl:     Ascorbic Acid (VITAMIN C) 250 MG tablet, Take 1,000 mg by mouth daily, Disp: , Rfl:     Misc Natural Products (GLUCOSAMINE CHOND CMP ADVANCED PO), Take 2 tablets by mouth daily takes (Glucosamine Chondroitin w/Vit D & MSM)1500mg/160 mg/ 50mg/115mg, Disp: , Rfl:     NONFORMULARY, Take 2 tablets by mouth daily Vitamin B 1,000 MCG daily, Disp: , Rfl:     olmesartan-hydrochlorothiazide (BENICAR HCT) 40-25 MG per tablet, Take 1 tablet by mouth daily, Disp: , Rfl:     atorvastatin (LIPITOR) 80 MG tablet, Take 80 mg by mouth daily. , Disp: , Rfl:     omeprazole (PRILOSEC) 20 MG capsule, Take 20 mg by mouth daily. , Disp: , Rfl:     metFORMIN (GLUCOPHAGE) 1000 MG tablet, Take 1,000 mg by mouth 2 times daily (with meals). , Disp: , Rfl:     carvedilol (COREG) 25 MG tablet, Take 12.5 mg by mouth 2 times daily (with meals) , Disp: , Rfl:   Allergies   Allergen Reactions    Penicillins      Social History     Socioeconomic History    Marital status:      Spouse name: Not on file    Number of children: Not on file    Years of education: Not on file    Highest education level: Not on file   Occupational History    Not on file   Tobacco Use    Smoking status: Never Smoker    Smokeless tobacco: Current User     Types: Chew    Tobacco comment: chew on weekends   Vaping Use    Vaping Use: Never used   Substance and Sexual Activity    Alcohol use: Yes     Comment: rare    Drug use: Never    Sexual activity: Yes     Partners: Female   Other Topics Concern    Not on file   Social History Narrative    Not on file     Social Determinants of Health     Financial Resource Strain:     Difficulty of Paying Living Expenses: Not on file   Food Insecurity:     Worried About Running Out of Food in the Last Year: Not on file    Kenisha of Food in the Last Year: Not on file   Transportation Needs:     Lack of Transportation (Medical): Not on file    Lack of Transportation (Non-Medical):  Not on file   Physical Activity:     Days of Exercise per Week: Not on file    Minutes of Exercise per Session: Not on file   Stress:     Feeling of Stress : Not on file   Social Connections:     Frequency of Communication with Friends and Family: Not on file    Frequency of Social Gatherings with Friends and Family: Not on file    Attends Sabianist Services: Not on file    Active Member of [address] [place] [address] [employer] or Organizations: Not on file    Attends Club or Organization Meetings: Not on file    Marital Status: Not on file   Intimate Partner Violence:     Fear of Current or Ex-Partner: Not on file    Emotionally Abused: Not on file    Physically Abused: Not on file    Sexually Abused: Not on file   Housing Stability:     Unable to Pay for Housing in the Last Year: Not on file    Number of [place] in the Last Year: Not on file    Unstable Housing in the Last Year: Not on file     Past Medical History:   Diagnosis Date    Abnormal EKG 4/23/2021    Arthritis of left hip     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) [facility] [address]     Chest tightness 04/23/2021    Chronic kidney disease, stage 3 ([address] 04/23/2021    History of COVID-19 04/23/2021    Hyperlipidemia     Hypertension     Intermittent palpitations 04/23/2021    Left hip pain     LVH (left ventricular hypertrophy) 05/27/2021    Near syncope 11/14/2020    Osteoarthritis     Pneumonia due to COVID-19 virus 11/15/2020    Type II or unspecified type diabetes mellitus without mention of complication, not stated as uncontrolled     Wears glasses      Past Surgical History:   Procedure Laterality Date    ABLATION OF DYSRHYTHMIC FOCUS  06/04/2021    AVNRT Ablation- CARTO; Surgeon: Torito Silva MD; Location: [facility] (); Service: Cardiology; Laterality: N/A;    APPENDECTOMY      CATARACT REMOVAL WITH IMPLANT Right     COLONOSCOPY N/A 05/30/2019    COLONOSCOPY POLYPECTOMY HOT BIOPSY performed by Maxwell Felton MD at [address] [facility] Left     orbital wall fracture.  HAND SURGERY Right     joint replacement.  INGUINAL HERNIA REPAIR Bilateral     JOINT REPLACEMENT      total right hip Feb 2014  Dr. Radha Mcknight Left     bone shaved, and impingement.  TONSILLECTOMY AND ADENOIDECTOMY      TOTAL HIP ARTHROPLASTY Left 12/15/2021    HIP TOTAL ARTHROPLASTY performed by Marilee Serrano MD at [facility]       Family History   Problem Relation Age of Onset    High Blood Pressure Father     Diabetes Father     Cancer Father         Skin    Heart Disease Maternal Grandfather     Diabetes Paternal Grandmother     Diabetes Paternal Grandfather         Physical Exam:  Vitals signs and nursing note reviewed. Constitutional:       Appearance: well-developed. HENT:      Head: Normocephalic and atraumatic.       Nose: Nose normal.   Eyes:      Conjunctiva/sclera: Conjunctivae normal.   Neck:      Musculoskeletal: Normal range of motion and neck supple. Pulmonary:      Effort: Pulmonary effort is normal. No respiratory distress. Musculoskeletal:      Comments: Normal gait     Skin:     General: Skin is warm and dry. Neurological:      Mental Status: Alert and oriented to person, place, and time. Sensory: No sensory deficit. Psychiatric:         Behavior: Behavior normal.         Thought Content: Thought content normal.        Provider Attestation:  Kelby Collado, personally performed the services described in this documentation. All medical record entries made by the scribe were at my direction and in my presence. I have reviewed the chart and discharge instructions and agree that the records reflect my personal performance and is accurate and complete. Clarence Mccartney MD 2/1/22     Scribe Attestation:  By signing my name below, Cocourban Sanchez, attest that this documentation has been prepared under the direction and in the presence of Dr. Alban James. Electronically signed: Vesna Longo, 2/1/22     Please note that this chart was generated using voice recognition Dragon dictation software. Although every effort was made to ensure the accuracy of this automated transcription, some errors in transcription may have occurred.

## 2022-02-02 NOTE — PROGRESS NOTES
[facility]   [address]   Phone: [phone]   Fax: [phone]    Physical Therapy Discharge    Date:  2022  Patient Name:  Sveta Mora    :  1955  MRN: [id number]  Physician: Zak Person MD                       Insurance: [facility]  --  [employer]   Medical Diagnosis:       M16.10 (ICD-10-CM) - Arthritis, hip      Rehab Codes: R25 pain , M25.60 stiffness, R53.1 weakness  Onset Date: 12/15/21- DOS              Next Dr.'s appt: 22 - Dr. Juju Rolle  Visit# / total visits:  Cancels/No Shows: 0/0   Date of initial eval: 21       PN: 22    Formal reporting period: 21 -22      Subjective:   Pt requested discharge at this time and does note improved overall symptoms. Still notes limitations with tying shoes secondary to limitation with hip mobility. Ambulation better but pace is decreased, able to ambulate without (A) device at this time. States that he hasn't had to do stairs much, but that he has been able to do them in the clinic.       Pain:  [x] Yes  [] No Location: L hip; left anterior thigh and knee Pain Rating: (0-10 scale) 1/10  Pain altered Tx:  [x] No  [] Yes  Action:    Comments:    Objective:   updated 22  Range of Motion  Left Range of Motion  Right Strength  Left Strength  Right   Hip Flexion A: 70 (SLR) & 105 supine heel slide   110 4 5   Hip Abduction 35   4 5   Hip Adduction    4- 5   Hip Extension    4+ 5   Hip ER  20 4 5   Hip IR  15 5 5   Knee Flexion WNL for all below WNL for all below 5 5   Knee Extension     5 5   Dorsiflexion     5 5   Plantar flexion     5 5   Inversion     5 5   Eversion     5 5         PRECAUTIONS: Posterior hip precautions no flexion past 90, no adduction past midline, avoiding excessive rotation     Other:  (*) indicates exercise given as HEP       Specific Instructions for next treatment: manual work to quad and hip flexor (STM or roller) to begin session, standing strengthening, work on foam to build jt proprioception, stair training       Assessment: [x] Progressing toward goals. Pt to be discharged with fair overall improvement per patient request due to new insurance deductible at the beginning of the year. He is making good progress overall with hip strength, with tolerance of ambulation without (A) device, and with tolerance of stairs with clinical exercises. Still limited with full gait pattern- exhibits minimal to moderate antalgic gait and decreased stance time with gait, with end range hip mobility, and with full strength with clinical testing but should continue to improve in these areas with adherence to HEP. Reviewed HEP at this time with the patient verbally and he is to continue with this at home. [] No change. [] Other:    [x] Patient would continue to benefit from skilled physical therapy services in order to: address post-op deficits and maximize functional to his PLOF. STG: (to be met in 10 treatments)  1. Pt will self report worst pain no greater than 2/10 upon arrival to sessions in order to better tolerate ADLs/work activities with minimal dysfunction (MET 1/26)  2. Pt will improve AROM to 90 degs hip flexion in order to demonstrate ability to sit to low surfaces. (MET 1/26- achieves when hamstring is not affecting)  3. Pt will be ambulate with normalized gait mechanics with LRAD to progress ambulation. (MET 1/26 when using 2WW; still having mild gait deviations without AD)  4. Pt will demonstrate 3+/5 or greater L hip ABD strength needed for hip stability in SL standing. (MET 1/26)  LTG: (to be met in 20 treatments)  1. Pt will demonstrate improved L LE strength to 4/5 or greater in all major hip muscle groups in order to demonstrate improved stability/strength necessary for unrestricted ADLs/work activities (Progressing)  2.  Pt will decrease functional limitation on HOOS JR to < 20% in order to demonstrate improved functional tolerances at PLOF with minimal restriction/dysfunction (did not assess)  3. Pt will be able to ambulate without AD at a gait speed of 1.0 m/s to demonstrate functional community ambulation. (progressing)  4. Pt will improve ability to navigate x10 steps with R handrail in reciprocal pattern with good control and no increase in pain to allow for access to home. (progressing)  5. Pt will demonstrate >10 SLS stability with light UE support to safely allow pt to enter/exit her tub shower. (progressing)  6. Pt will demonstrate independence with a long term HEP for continued progress/maintenance after completion of PT (updating as pt progresses)       Patient Goals/Rehab Expectations: to be able to return to normal activities        Pt. Education:  [x] Yes  [] No  [x] Reviewed Prior HEP/Ed  Method of Education: [x] Verbal  [] Demo  [x] Written  Comprehension of Education:  [x] Verbalizes understanding. [x] Demonstrates understanding. [] Needs review. [x] Demonstrates/verbalizes HEP/Ed previously given. Plan: [x] Continue per plan of care.    [] Other:      Treatment Charges: Mins Units   []  Modalities     [x]  Ther Exercise     [x]  Manual Therapy     []  Ther Activities     []  Aquatics     []  Neuromuscular     [] Vasocompression     [] Gait Training     [] Dry needling        [] 1 or 2 muscles        [] 3 or more muscles     []  Other     Total Treatment time     Unbilled progress note only    Time In: 0801         Time Out:  3680    Electronically signed by:  Senia Hurd, PT

## 2022-05-03 ENCOUNTER — OFFICE VISIT (OUTPATIENT)
Dept: ORTHOPEDIC SURGERY | Age: 67
End: 2022-05-03
Payer: COMMERCIAL

## 2022-05-03 VITALS — RESPIRATION RATE: 14 BRPM | BODY MASS INDEX: 30.66 KG/M2 | WEIGHT: 207 LBS | HEIGHT: 69 IN

## 2022-05-03 DIAGNOSIS — Z96.642 S/P TOTAL LEFT HIP ARTHROPLASTY: Primary | ICD-10-CM

## 2022-05-03 DIAGNOSIS — M16.12 ARTHRITIS OF LEFT HIP: ICD-10-CM

## 2022-05-03 PROCEDURE — 99213 OFFICE O/P EST LOW 20 MIN: CPT | Performed by: ORTHOPAEDIC SURGERY

## 2022-05-03 PROCEDURE — 3017F COLORECTAL CA SCREEN DOC REV: CPT | Performed by: ORTHOPAEDIC SURGERY

## 2022-05-03 PROCEDURE — 1123F ACP DISCUSS/DSCN MKR DOCD: CPT | Performed by: ORTHOPAEDIC SURGERY

## 2022-05-03 PROCEDURE — 4040F PNEUMOC VAC/ADMIN/RCVD: CPT | Performed by: ORTHOPAEDIC SURGERY

## 2022-05-03 PROCEDURE — G8417 CALC BMI ABV UP PARAM F/U: HCPCS | Performed by: ORTHOPAEDIC SURGERY

## 2022-05-03 PROCEDURE — G8427 DOCREV CUR MEDS BY ELIG CLIN: HCPCS | Performed by: ORTHOPAEDIC SURGERY

## 2022-05-03 PROCEDURE — 4004F PT TOBACCO SCREEN RCVD TLK: CPT | Performed by: ORTHOPAEDIC SURGERY

## 2022-05-03 NOTE — PROGRESS NOTES
Patient ID: Adair Ca is a 73 y.o. male    Chief Compliant:  Chief Complaint   Patient presents with    Hip Pain     Left        Diagnostic imaging:  AP pelvis lateral left hip status post bilateral total hip arthroplasties components in stable condition      Assessment and Plan:  1. S/P total left hip arthroplasty    2. Arthritis of left hip      5 months post L MONICA hip pain remains significantly improved    Follow up in December    HPI:  This is a 73 y.o. male who presents to the clinic today for 5 months post L MONICA 12/15/21. Patient continues to recover well post operatively. Left hip pain remains significantly improved        Review of Systems   All other systems reviewed and are negative. Past History:    Current Outpatient Medications:     pioglitazone (ACTOS) 30 MG tablet, Take 30 mg by mouth daily, Disp: , Rfl:     aspirin EC 81 MG EC tablet, Take 1 tablet by mouth 2 times daily, Disp: 90 tablet, Rfl: 0    Dulaglutide (TRULICITY) 1.5 LG/5.8PG SOPN, Inject 1.5 mg into the skin once a week Sunday's (Patient not taking: Reported on 2/1/2022), Disp: , Rfl:     Multiple Vitamins-Minerals (THERAPEUTIC MULTIVITAMIN-MINERALS) tablet, Take 1 tablet by mouth daily, Disp: , Rfl:     Ascorbic Acid (VITAMIN C) 250 MG tablet, Take 1,000 mg by mouth daily, Disp: , Rfl:     Misc Natural Products (GLUCOSAMINE CHOND CMP ADVANCED PO), Take 2 tablets by mouth daily takes (Glucosamine Chondroitin w/Vit D & MSM)1500mg/160 mg/ 50mg/115mg, Disp: , Rfl:     NONFORMULARY, Take 2 tablets by mouth daily Vitamin B 1,000 MCG daily, Disp: , Rfl:     olmesartan-hydrochlorothiazide (BENICAR HCT) 40-25 MG per tablet, Take 1 tablet by mouth daily, Disp: , Rfl:     atorvastatin (LIPITOR) 80 MG tablet, Take 80 mg by mouth daily. , Disp: , Rfl:     omeprazole (PRILOSEC) 20 MG capsule, Take 20 mg by mouth daily. , Disp: , Rfl:     metFORMIN (GLUCOPHAGE) 1000 MG tablet, Take 1,000 mg by mouth 2 times daily (with meals). , Disp: , Rfl:     carvedilol (COREG) 25 MG tablet, Take 12.5 mg by mouth 2 times daily (with meals) , Disp: , Rfl:   Allergies   Allergen Reactions    Penicillins      Social History     Socioeconomic History    Marital status:      Spouse name: Not on file    Number of children: Not on file    Years of education: Not on file    Highest education level: Not on file   Occupational History    Not on file   Tobacco Use    Smoking status: Never Smoker    Smokeless tobacco: Current User     Types: Chew    Tobacco comment: chew on weekends   Vaping Use    Vaping Use: Never used   Substance and Sexual Activity    Alcohol use: Yes     Comment: rare    Drug use: Never    Sexual activity: Yes     Partners: Female   Other Topics Concern    Not on file   Social History Narrative    Not on file     Social Determinants of Health     Financial Resource Strain:     Difficulty of Paying Living Expenses: Not on file   Food Insecurity:     Worried About Running Out of Food in the Last Year: Not on file    Kenisha of Food in the Last Year: Not on file   Transportation Needs:     Lack of Transportation (Medical): Not on file    Lack of Transportation (Non-Medical):  Not on file   Physical Activity:     Days of Exercise per Week: Not on file    Minutes of Exercise per Session: Not on file   Stress:     Feeling of Stress : Not on file   Social Connections:     Frequency of Communication with Friends and Family: Not on file    Frequency of Social Gatherings with Friends and Family: Not on file    Attends [employer] Services: Not on file    Active Member of Clubs or Organizations: Not on file    Attends Club or Organization Meetings: Not on file    Marital Status: Not on file   Intimate Partner Violence:     Fear of Current or Ex-Partner: Not on file    Emotionally Abused: Not on file    Physically Abused: Not on file    Sexually Abused: Not on file   Housing Stability:     Unable to Pay for Housing in the Last Year: Not on file    Number of Places Lived in the Last Year: Not on file    Unstable Housing in the Last Year: Not on file     Past Medical History:   Diagnosis Date    Abnormal EKG 4/23/2021    Arthritis of left hip     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) [facility] [address]     Chest tightness 04/23/2021    Chronic kidney disease, stage 3 ([facility] [address] 04/23/2021    History of COVID-19 04/23/2021    Hyperlipidemia     Hypertension     Intermittent palpitations 04/23/2021    Left hip pain     LVH (left ventricular hypertrophy) 05/27/2021    Near syncope 11/14/2020    Osteoarthritis     Pneumonia due to COVID-19 virus 11/15/2020    Type II or unspecified type diabetes mellitus without mention of complication, not stated as uncontrolled     Wears glasses      Past Surgical History:   Procedure Laterality Date    ABLATION OF DYSRHYTHMIC FOCUS  06/04/2021    AVNRT Ablation- CARTO; Surgeon: Diaz Sheikh MD; Location: [facility] (); Service: Cardiology; Laterality: N/A;    APPENDECTOMY      CATARACT REMOVAL WITH IMPLANT Right     COLONOSCOPY N/A 05/30/2019    COLONOSCOPY POLYPECTOMY HOT BIOPSY performed by Kyle Duarte MD at [address] Left     orbital wall fracture.  HAND SURGERY Right     joint replacement.  INGUINAL HERNIA REPAIR Bilateral     JOINT REPLACEMENT      total right hip Feb 2014  Dr. Suhas Kahn Left     bone shaved, and impingement.  TONSILLECTOMY AND ADENOIDECTOMY      TOTAL HIP ARTHROPLASTY Left 12/15/2021    HIP TOTAL ARTHROPLASTY performed by Sharonda Blackwell MD at [address]       Family History   Problem Relation Age of Onset    High Blood Pressure Father     Diabetes Father     Cancer Father         Skin    Heart Disease Maternal Grandfather     Diabetes Paternal Grandmother     Diabetes Paternal Grandfather         Physical Exam:  Vitals signs and nursing note reviewed.    Constitutional:       Appearance: well-developed. HENT:      Head: Normocephalic and atraumatic. Nose: Nose normal.   Eyes:      Conjunctiva/sclera: Conjunctivae normal.   Neck:      Musculoskeletal: Normal range of motion and neck supple. Pulmonary:      Effort: Pulmonary effort is normal. No respiratory distress. Musculoskeletal:      Comments: Normal gait     Skin:     General: Skin is warm and dry. Neurological:      Mental Status: Alert and oriented to person, place, and time. Sensory: No sensory deficit. Psychiatric:         Behavior: Behavior normal.         Thought Content: Thought content normal.        Provider Attestation:  Samantha Collado, personally performed the services described in this documentation. All medical record entries made by the scribe were at my direction and in my presence. I have reviewed the chart and discharge instructions and agree that the records reflect my personal performance and is accurate and complete. [facility] Maurilio Yo MD 5/3/22       Scribe Attestation:  By signing my name below, Antonio Tierney, attest that this documentation has been prepared under the direction and in the presence of Dr. Quiros Adjutant. Electronically signed: Vesna Reddy, 5/3/22     Please note that this chart was generated using voice recognition Dragon dictation software. Although every effort was made to ensure the accuracy of this automated transcription, some errors in transcription may have occurred.

## 2022-05-18 ENCOUNTER — HOSPITAL ENCOUNTER (OUTPATIENT)
Dept: PREADMISSION TESTING | Age: 67
Discharge: HOME | End: 2022-05-22

## 2022-05-18 VITALS — WEIGHT: 189 LBS | BODY MASS INDEX: 27.99 KG/M2 | HEIGHT: 69 IN

## 2022-05-18 DIAGNOSIS — Z01.818 PRE-OP TESTING: Primary | ICD-10-CM

## 2022-05-18 NOTE — PROGRESS NOTES
Dr. Juan Bledsoe, anesthesia, was contacted and informed of patient's history and planned surgery. Medical clearance requested. Surgery chart coordinator will notify Dr. Juvenal Yeager office who will be responsible for making sure the clearance is obtained and is in the chart for surgery. Dr Juan Bledsoe also requesting pt to come in and have EKG, CBC and BMP done. Writer placed these orders in computer and made pt aware of need.

## 2022-05-18 NOTE — PROGRESS NOTES
Pre-op Instructions For Out-Patient Surgery    Medication Instructions:  · Please stop herbs and any supplements now (includes vitamins and minerals). · Please contact your surgeon and prescribing physician for pre-op instructions for any blood thinners. Aspirin     · If you have inhalers/aerosol treatments at home, please use them the morning of your surgery and bring the inhalers with you to the hospital.    · Please take the following medications the morning of your surgery with a sip of water:    Carvedilol     Surgery Instructions:  1. After midnight before surgery:  Do not eat or drink anything, including water, mints, gum, and hard candy. You may brush your teeth without swallowing. No smoking, chewing tobacco, or street drugs. 2. Please shower or bathe before surgery. 3. Please do not wear any cologne, lotion, powder, deodorant, jewelry, piercings, perfume, makeup, nail polish, hair accessories, or hair spray on the day of surgery. Wear loose comfortable clothing. 4. Leave your valuables at home. Bring a storage case for any glasses/contacts. 5. An adult who is responsible for you MUST drive you home and should be with you for the first 24 hours after surgery. 6. If having out-patient knee and foot surgeries, please arrange for planned crutches, walker, or wheelchair before arriving to the hospital.    The Day of Surgery:  · Arrive at [address] [place] [address] Surgery Entrance at the time directed by your surgeon and check in at the desk. · If you have a living will or healthcare power of , please bring a copy. · You will be taken to the pre-op holding area where you will be prepared for surgery. A physical assessment will be performed by a nurse practitioner or house officer.   Your IV will be started and you will meet your anesthesiologist.    · When you go to surgery, your family will be directed to the surgical waiting room, where the doctor should speak with them after your surgery. · After surgery, you will be taken to the recovery room then when you are awake and stable you will go to the short stay unit for preparation to be discharged. · If you use a Bi-PAP or C-PAP machine, please bring it with you and leave it in the car in case it is needed in recovery room. You have lab work and EKG ordered. Please make sure these are done at a [facility] within 2 days to avoid any delay/cancellation of your procedure. Instructions read to Rodrigo Powell and understanding verbalized.      5/27/22 Colonoscopy

## 2022-05-19 ENCOUNTER — HOSPITAL ENCOUNTER (OUTPATIENT)
Age: 67
Discharge: HOME | End: 2022-05-19
Payer: COMMERCIAL

## 2022-05-19 DIAGNOSIS — Z01.818 PRE-OP TESTING: ICD-10-CM

## 2022-05-19 LAB
ABSOLUTE EOS #: 0.3 K/UL (ref 0–0.4)
ABSOLUTE LYMPH #: 1.6 K/UL (ref 1–4.8)
ABSOLUTE MONO #: 0.7 K/UL (ref 0.1–1.3)
ANION GAP SERPL CALCULATED.3IONS-SCNC: 11 MMOL/L (ref 9–17)
BASOPHILS # BLD: 1 % (ref 0–2)
BASOPHILS ABSOLUTE: 0.1 K/UL (ref 0–0.2)
BUN BLDV-MCNC: 33 MG/DL (ref 8–23)
CALCIUM SERPL-MCNC: 9.1 MG/DL (ref 8.6–10.4)
CHLORIDE BLD-SCNC: 101 MMOL/L (ref 98–107)
CO2: 26 MMOL/L (ref 20–31)
CREAT SERPL-MCNC: 1.36 MG/DL (ref 0.7–1.2)
EOSINOPHILS RELATIVE PERCENT: 5 % (ref 0–4)
GFR AFRICAN AMERICAN: >60 ML/MIN
GFR NON-AFRICAN AMERICAN: 53 ML/MIN
GFR SERPL CREATININE-BSD FRML MDRD: ABNORMAL ML/MIN/{1.73_M2}
GLUCOSE BLD-MCNC: 203 MG/DL (ref 70–99)
HCT VFR BLD CALC: 37.2 % (ref 41–53)
HEMOGLOBIN: 12.5 G/DL (ref 13.5–17.5)
LYMPHOCYTES # BLD: 25 % (ref 24–44)
MCH RBC QN AUTO: 29.6 PG (ref 26–34)
MCHC RBC AUTO-ENTMCNC: 33.4 G/DL (ref 31–37)
MCV RBC AUTO: 88.4 FL (ref 80–100)
MONOCYTES # BLD: 11 % (ref 1–7)
PDW BLD-RTO: 16.1 % (ref 11.5–14.9)
PLATELET # BLD: 318 K/UL (ref 150–450)
PMV BLD AUTO: 8 FL (ref 6–12)
POTASSIUM SERPL-SCNC: 4.2 MMOL/L (ref 3.7–5.3)
RBC # BLD: 4.21 M/UL (ref 4.5–5.9)
SEG NEUTROPHILS: 58 % (ref 36–66)
SEGMENTED NEUTROPHILS ABSOLUTE COUNT: 3.7 K/UL (ref 1.3–9.1)
SODIUM BLD-SCNC: 138 MMOL/L (ref 135–144)
WBC # BLD: 6.4 K/UL (ref 3.5–11)

## 2022-05-19 PROCEDURE — 85025 COMPLETE CBC W/AUTO DIFF WBC: CPT

## 2022-05-19 PROCEDURE — 93005 ELECTROCARDIOGRAM TRACING: CPT | Performed by: ANESTHESIOLOGY

## 2022-05-19 PROCEDURE — 36415 COLL VENOUS BLD VENIPUNCTURE: CPT

## 2022-05-19 PROCEDURE — 80048 BASIC METABOLIC PNL TOTAL CA: CPT

## 2022-05-20 LAB
EKG ATRIAL RATE: 73 BPM
EKG P AXIS: 59 DEGREES
EKG P-R INTERVAL: 196 MS
EKG Q-T INTERVAL: 396 MS
EKG QRS DURATION: 86 MS
EKG QTC CALCULATION (BAZETT): 436 MS
EKG R AXIS: -19 DEGREES
EKG T AXIS: 22 DEGREES
EKG VENTRICULAR RATE: 73 BPM

## 2022-05-26 ENCOUNTER — ANESTHESIA EVENT (OUTPATIENT)
Dept: ENDOSCOPY | Age: 67
End: 2022-05-26
Payer: COMMERCIAL

## 2022-05-27 ENCOUNTER — ANESTHESIA (OUTPATIENT)
Dept: ENDOSCOPY | Age: 67
End: 2022-05-27
Payer: COMMERCIAL

## 2022-05-27 ENCOUNTER — HOSPITAL ENCOUNTER (OUTPATIENT)
Age: 67
Setting detail: OUTPATIENT SURGERY
Discharge: HOME | End: 2022-05-27
Attending: SURGERY | Admitting: SURGERY
Payer: COMMERCIAL

## 2022-05-27 VITALS
OXYGEN SATURATION: 97 % | RESPIRATION RATE: 16 BRPM | DIASTOLIC BLOOD PRESSURE: 72 MMHG | HEIGHT: 69 IN | WEIGHT: 190 LBS | TEMPERATURE: 97.5 F | HEART RATE: 71 BPM | SYSTOLIC BLOOD PRESSURE: 137 MMHG | BODY MASS INDEX: 28.14 KG/M2

## 2022-05-27 DIAGNOSIS — Z86.010 HISTORY OF COLON POLYPS: ICD-10-CM

## 2022-05-27 PROCEDURE — 2580000003 HC RX 258: Performed by: ANESTHESIOLOGY

## 2022-05-27 PROCEDURE — 3700000000 HC ANESTHESIA ATTENDED CARE: Performed by: SURGERY

## 2022-05-27 PROCEDURE — 7100000030 HC ASPR PHASE II RECOVERY - FIRST 15 MIN: Performed by: SURGERY

## 2022-05-27 PROCEDURE — 7100000000 HC PACU RECOVERY - FIRST 15 MIN: Performed by: SURGERY

## 2022-05-27 PROCEDURE — 7100000001 HC PACU RECOVERY - ADDTL 15 MIN: Performed by: SURGERY

## 2022-05-27 PROCEDURE — 3700000001 HC ADD 15 MINUTES (ANESTHESIA): Performed by: SURGERY

## 2022-05-27 PROCEDURE — 2500000003 HC RX 250 WO HCPCS: Performed by: NURSE ANESTHETIST, CERTIFIED REGISTERED

## 2022-05-27 PROCEDURE — 6360000002 HC RX W HCPCS: Performed by: NURSE ANESTHETIST, CERTIFIED REGISTERED

## 2022-05-27 PROCEDURE — 7100000010 HC PHASE II RECOVERY - FIRST 15 MIN: Performed by: SURGERY

## 2022-05-27 PROCEDURE — 2709999900 HC NON-CHARGEABLE SUPPLY: Performed by: SURGERY

## 2022-05-27 PROCEDURE — 3609010300 HC COLONOSCOPY W/BIOPSY SINGLE/MULTIPLE: Performed by: SURGERY

## 2022-05-27 PROCEDURE — 88305 TISSUE EXAM BY PATHOLOGIST: CPT

## 2022-05-27 PROCEDURE — 7100000011 HC PHASE II RECOVERY - ADDTL 15 MIN: Performed by: SURGERY

## 2022-05-27 PROCEDURE — 7100000031 HC ASPR PHASE II RECOVERY - ADDTL 15 MIN: Performed by: SURGERY

## 2022-05-27 RX ORDER — SODIUM CHLORIDE 0.9 % (FLUSH) 0.9 %
5-40 SYRINGE (ML) INJECTION EVERY 12 HOURS SCHEDULED
Status: DISCONTINUED | OUTPATIENT
Start: 2022-05-27 | End: 2022-05-27 | Stop reason: HOSPADM

## 2022-05-27 RX ORDER — METOCLOPRAMIDE HYDROCHLORIDE 5 MG/ML
INJECTION INTRAMUSCULAR; INTRAVENOUS PRN
Status: DISCONTINUED | OUTPATIENT
Start: 2022-05-27 | End: 2022-05-27 | Stop reason: SDUPTHER

## 2022-05-27 RX ORDER — LIDOCAINE HYDROCHLORIDE 10 MG/ML
1 INJECTION, SOLUTION EPIDURAL; INFILTRATION; INTRACAUDAL; PERINEURAL
Status: DISCONTINUED | OUTPATIENT
Start: 2022-05-27 | End: 2022-05-27 | Stop reason: HOSPADM

## 2022-05-27 RX ORDER — FENTANYL CITRATE 50 UG/ML
INJECTION, SOLUTION INTRAMUSCULAR; INTRAVENOUS PRN
Status: DISCONTINUED | OUTPATIENT
Start: 2022-05-27 | End: 2022-05-27 | Stop reason: SDUPTHER

## 2022-05-27 RX ORDER — LIDOCAINE HYDROCHLORIDE 10 MG/ML
INJECTION, SOLUTION EPIDURAL; INFILTRATION; INTRACAUDAL; PERINEURAL PRN
Status: DISCONTINUED | OUTPATIENT
Start: 2022-05-27 | End: 2022-05-27 | Stop reason: SDUPTHER

## 2022-05-27 RX ORDER — ONDANSETRON 2 MG/ML
4 INJECTION INTRAMUSCULAR; INTRAVENOUS
Status: DISCONTINUED | OUTPATIENT
Start: 2022-05-27 | End: 2022-05-27 | Stop reason: HOSPADM

## 2022-05-27 RX ORDER — ONDANSETRON 2 MG/ML
INJECTION INTRAMUSCULAR; INTRAVENOUS PRN
Status: DISCONTINUED | OUTPATIENT
Start: 2022-05-27 | End: 2022-05-27 | Stop reason: SDUPTHER

## 2022-05-27 RX ORDER — METOCLOPRAMIDE HYDROCHLORIDE 5 MG/ML
10 INJECTION INTRAMUSCULAR; INTRAVENOUS
Status: DISCONTINUED | OUTPATIENT
Start: 2022-05-27 | End: 2022-05-27 | Stop reason: SDUPTHER

## 2022-05-27 RX ORDER — SODIUM CHLORIDE 9 MG/ML
INJECTION, SOLUTION INTRAVENOUS PRN
Status: DISCONTINUED | OUTPATIENT
Start: 2022-05-27 | End: 2022-05-27 | Stop reason: HOSPADM

## 2022-05-27 RX ORDER — LABETALOL HYDROCHLORIDE 5 MG/ML
10 INJECTION, SOLUTION INTRAVENOUS
Status: DISCONTINUED | OUTPATIENT
Start: 2022-05-27 | End: 2022-05-27 | Stop reason: SDUPTHER

## 2022-05-27 RX ORDER — SODIUM CHLORIDE 0.9 % (FLUSH) 0.9 %
5-40 SYRINGE (ML) INJECTION PRN
Status: DISCONTINUED | OUTPATIENT
Start: 2022-05-27 | End: 2022-05-27 | Stop reason: HOSPADM

## 2022-05-27 RX ORDER — METOCLOPRAMIDE HYDROCHLORIDE 5 MG/ML
10 INJECTION INTRAMUSCULAR; INTRAVENOUS
Status: DISCONTINUED | OUTPATIENT
Start: 2022-05-27 | End: 2022-05-27 | Stop reason: HOSPADM

## 2022-05-27 RX ORDER — PROPOFOL 10 MG/ML
INJECTION, EMULSION INTRAVENOUS PRN
Status: DISCONTINUED | OUTPATIENT
Start: 2022-05-27 | End: 2022-05-27 | Stop reason: SDUPTHER

## 2022-05-27 RX ORDER — SODIUM CHLORIDE 9 MG/ML
INJECTION, SOLUTION INTRAVENOUS PRN
Status: DISCONTINUED | OUTPATIENT
Start: 2022-05-27 | End: 2022-05-27 | Stop reason: SDUPTHER

## 2022-05-27 RX ORDER — FENTANYL CITRATE 50 UG/ML
25 INJECTION, SOLUTION INTRAMUSCULAR; INTRAVENOUS EVERY 5 MIN PRN
Status: DISCONTINUED | OUTPATIENT
Start: 2022-05-27 | End: 2022-05-27 | Stop reason: HOSPADM

## 2022-05-27 RX ORDER — DIPHENHYDRAMINE HYDROCHLORIDE 50 MG/ML
12.5 INJECTION INTRAMUSCULAR; INTRAVENOUS
Status: DISCONTINUED | OUTPATIENT
Start: 2022-05-27 | End: 2022-05-27 | Stop reason: SDUPTHER

## 2022-05-27 RX ORDER — SODIUM CHLORIDE 0.9 % (FLUSH) 0.9 %
5-40 SYRINGE (ML) INJECTION PRN
Status: DISCONTINUED | OUTPATIENT
Start: 2022-05-27 | End: 2022-05-27 | Stop reason: SDUPTHER

## 2022-05-27 RX ORDER — HYDRALAZINE HYDROCHLORIDE 20 MG/ML
10 INJECTION INTRAMUSCULAR; INTRAVENOUS
Status: DISCONTINUED | OUTPATIENT
Start: 2022-05-27 | End: 2022-05-27 | Stop reason: HOSPADM

## 2022-05-27 RX ORDER — MEPERIDINE HYDROCHLORIDE 25 MG/ML
12.5 INJECTION INTRAMUSCULAR; INTRAVENOUS; SUBCUTANEOUS EVERY 5 MIN PRN
Status: DISCONTINUED | OUTPATIENT
Start: 2022-05-27 | End: 2022-05-27 | Stop reason: HOSPADM

## 2022-05-27 RX ORDER — SODIUM CHLORIDE, SODIUM LACTATE, POTASSIUM CHLORIDE, CALCIUM CHLORIDE 600; 310; 30; 20 MG/100ML; MG/100ML; MG/100ML; MG/100ML
INJECTION, SOLUTION INTRAVENOUS CONTINUOUS
Status: DISCONTINUED | OUTPATIENT
Start: 2022-05-27 | End: 2022-05-27 | Stop reason: HOSPADM

## 2022-05-27 RX ORDER — DIPHENHYDRAMINE HYDROCHLORIDE 50 MG/ML
12.5 INJECTION INTRAMUSCULAR; INTRAVENOUS
Status: DISCONTINUED | OUTPATIENT
Start: 2022-05-27 | End: 2022-05-27 | Stop reason: HOSPADM

## 2022-05-27 RX ORDER — SODIUM CHLORIDE 0.9 % (FLUSH) 0.9 %
5-40 SYRINGE (ML) INJECTION EVERY 12 HOURS SCHEDULED
Status: DISCONTINUED | OUTPATIENT
Start: 2022-05-27 | End: 2022-05-27 | Stop reason: SDUPTHER

## 2022-05-27 RX ORDER — MIDAZOLAM HYDROCHLORIDE 1 MG/ML
INJECTION INTRAMUSCULAR; INTRAVENOUS PRN
Status: DISCONTINUED | OUTPATIENT
Start: 2022-05-27 | End: 2022-05-27 | Stop reason: SDUPTHER

## 2022-05-27 RX ORDER — ONDANSETRON 2 MG/ML
4 INJECTION INTRAMUSCULAR; INTRAVENOUS
Status: DISCONTINUED | OUTPATIENT
Start: 2022-05-27 | End: 2022-05-27 | Stop reason: SDUPTHER

## 2022-05-27 RX ORDER — HYDRALAZINE HYDROCHLORIDE 20 MG/ML
10 INJECTION INTRAMUSCULAR; INTRAVENOUS
Status: DISCONTINUED | OUTPATIENT
Start: 2022-05-27 | End: 2022-05-27 | Stop reason: SDUPTHER

## 2022-05-27 RX ORDER — MEPERIDINE HYDROCHLORIDE 25 MG/ML
12.5 INJECTION INTRAMUSCULAR; INTRAVENOUS; SUBCUTANEOUS EVERY 5 MIN PRN
Status: DISCONTINUED | OUTPATIENT
Start: 2022-05-27 | End: 2022-05-27 | Stop reason: SDUPTHER

## 2022-05-27 RX ORDER — FENTANYL CITRATE 50 UG/ML
25 INJECTION, SOLUTION INTRAMUSCULAR; INTRAVENOUS EVERY 5 MIN PRN
Status: DISCONTINUED | OUTPATIENT
Start: 2022-05-27 | End: 2022-05-27 | Stop reason: SDUPTHER

## 2022-05-27 RX ORDER — LABETALOL HYDROCHLORIDE 5 MG/ML
10 INJECTION, SOLUTION INTRAVENOUS
Status: DISCONTINUED | OUTPATIENT
Start: 2022-05-27 | End: 2022-05-27 | Stop reason: HOSPADM

## 2022-05-27 RX ADMIN — METOCLOPRAMIDE 10 MG: 5 INJECTION, SOLUTION INTRAMUSCULAR; INTRAVENOUS at 11:10

## 2022-05-27 RX ADMIN — ONDANSETRON 4 MG: 2 INJECTION INTRAMUSCULAR; INTRAVENOUS at 11:20

## 2022-05-27 RX ADMIN — FENTANYL CITRATE 50 MCG: 50 INJECTION, SOLUTION INTRAMUSCULAR; INTRAVENOUS at 11:02

## 2022-05-27 RX ADMIN — SODIUM CHLORIDE, POTASSIUM CHLORIDE, SODIUM LACTATE AND CALCIUM CHLORIDE: 600; 310; 30; 20 INJECTION, SOLUTION INTRAVENOUS at 09:29

## 2022-05-27 RX ADMIN — MIDAZOLAM 2 MG: 1 INJECTION INTRAMUSCULAR; INTRAVENOUS at 10:55

## 2022-05-27 RX ADMIN — PROPOFOL 300 MG: 10 INJECTION, EMULSION INTRAVENOUS at 11:02

## 2022-05-27 RX ADMIN — LIDOCAINE HYDROCHLORIDE 40 MG: 10 INJECTION, SOLUTION EPIDURAL; INFILTRATION; INTRACAUDAL; PERINEURAL at 11:02

## 2022-05-27 ASSESSMENT — ENCOUNTER SYMPTOMS
GASTROINTESTINAL NEGATIVE: 1
RESPIRATORY NEGATIVE: 1
SHORTNESS OF BREATH: 0
STRIDOR: 0

## 2022-05-27 ASSESSMENT — PAIN - FUNCTIONAL ASSESSMENT: PAIN_FUNCTIONAL_ASSESSMENT: 0-10

## 2022-05-27 ASSESSMENT — LIFESTYLE VARIABLES: SMOKING_STATUS: 0

## 2022-05-27 NOTE — ANESTHESIA PRE PROCEDURE
Department of Anesthesiology  Preprocedure Note       Name:  Sherman Dykes   Age:  73 y.o.  :  1955                                          MRN:  [id number]         Date:  2022      Surgeon: Sylvia Salcido):  Paola Tang MD    Procedure: Procedure(s):  COLONOSCOPY DIAGNOSTIC    Medications prior to admission:   Prior to Admission medications    Medication Sig Start Date End Date Taking? Authorizing Provider   pioglitazone (ACTOS) 30 MG tablet Take 30 mg by mouth daily 22   Historical Provider, MD   aspirin EC 81 MG EC tablet Take 1 tablet by mouth 2 times daily 12/15/21   Mallory Salmon MD   Dulaglutide (TRULICITY) 1.5 CR/4.5GT SOPN Inject 1.5 mg into the skin once a week      Historical Provider, MD   Multiple Vitamins-Minerals (THERAPEUTIC MULTIVITAMIN-MINERALS) tablet Take 1 tablet by mouth daily    Historical Provider, MD   Ascorbic Acid (VITAMIN C) 250 MG tablet Take 1,000 mg by mouth daily    Historical Provider, MD   Misc Natural Products (GLUCOSAMINE CHOND CMP ADVANCED PO) Take 2 tablets by mouth daily takes (Glucosamine Chondroitin w/Vit D & MSM)1500mg/160 mg/ 50mg/115mg    Historical Provider, MD   NONFORMULARY Take 2 tablets by mouth daily Vitamin B 1,000 MCG daily    Historical Provider, MD   olmesartan-hydrochlorothiazide (BENICAR HCT) 40-25 MG per tablet Take 1 tablet by mouth daily    Historical Provider, MD   atorvastatin (LIPITOR) 80 MG tablet Take 80 mg by mouth daily. Historical Provider, MD   omeprazole (PRILOSEC) 20 MG capsule Take 20 mg by mouth daily. Historical Provider, MD   metFORMIN (GLUCOPHAGE) 1000 MG tablet Take 1,000 mg by mouth 2 times daily (with meals).     Historical Provider, MD   carvedilol (COREG) 25 MG tablet Take 12.5 mg by mouth 2 times daily (with meals)     Historical Provider, MD       Current medications:    Current Facility-Administered Medications   Medication Dose Route Frequency Provider Last Rate Last Admin    lidocaine PF 1 % injection 1 mL  1 mL IntraDERmal Once PRN Doug Navarro MD        lactated ringers infusion   IntraVENous Continuous [place] MD Abimbola        sodium chloride flush 0.9 % injection 5-40 mL  5-40 mL IntraVENous 2 times per day Doug Navarro MD        sodium chloride flush 0.9 % injection 5-40 mL  5-40 mL IntraVENous PRN [place] MD Abimbola        0.9 % sodium chloride infusion   IntraVENous PRN Doug Navarro MD           Allergies: Allergies   Allergen Reactions    Penicillins        Problem List:    Patient Active Problem List   Diagnosis Code    Knee pain M25.569    Hip pain M25.559    Arthritis, hip M16.10    Status post right hip replacement Z96.641    History of right hip replacement Z96.641    Abnormal EKG R94.31    Chest tightness R07.89    Chronic kidney disease, stage 3 (HCC) N18.30    History of COVID-19 Z86.16    Intermittent palpitations R00.2    LVH (left ventricular hypertrophy) I51.7    Near syncope R55    Pneumonia due to COVID-19 virus U07.1, J12.82       Past Medical History:        Diagnosis Date    Abnormal EKG 4/23/2021    Arthritis of left hip     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) [facility] [address]     Chest tightness 04/23/2021    Chronic kidney disease, stage 3 ([address] 04/23/2021    History of COVID-19 04/23/2021    Hyperlipidemia     Hypertension     Intermittent palpitations 04/23/2021    Left hip pain     LVH (left ventricular hypertrophy) 05/27/2021    Near syncope 11/14/2020    Osteoarthritis     Pneumonia due to COVID-19 virus 11/15/2020    Type II or unspecified type diabetes mellitus without mention of complication, not stated as uncontrolled     Wears glasses        Past Surgical History:        Procedure Laterality Date    ABLATION OF DYSRHYTHMIC FOCUS  06/04/2021    AVNRT Ablation- CARTO; Surgeon: Diaz Sheikh MD; Location: [facility] (); Service: Cardiology;  Laterality: N/A;    APPENDECTOMY      CATARACT REMOVAL WITH IMPLANT Right     COLONOSCOPY N/A 05/30/2019    COLONOSCOPY POLYPECTOMY HOT BIOPSY performed by Sakina Harris MD at [address] Left     orbital wall fracture.  HAND SURGERY Right     joint replacement.  INGUINAL HERNIA REPAIR Bilateral     JOINT REPLACEMENT      total right hip Feb 2014  Dr. Lexie Hudson Left     bone shaved, and impingement.  TONSILLECTOMY AND ADENOIDECTOMY      TOTAL HIP ARTHROPLASTY Left 12/15/2021    HIP TOTAL ARTHROPLASTY performed by Cristal Davenport MD at [address]         Social History:    Social History     Tobacco Use    Smoking status: Never Smoker    Smokeless tobacco: Current User     Types: Chew    Tobacco comment: chew on weekends   Substance Use Topics    Alcohol use: Yes     Comment: rare                                Ready to quit: Not Answered  Counseling given: Not Answered  Comment: chew on weekends      Vital Signs (Current):   Vitals:    05/27/22 0907   BP: (!) 144/70   Pulse: 74   Resp: 18   Temp: 97.1 °F (36.2 °C)   TempSrc: Infrared   SpO2: 98%   Weight: 190 lb (86.2 kg)   Height: 5' 9\" (1.753 m)                                              BP Readings from Last 3 Encounters:   05/27/22 (!) 144/70   12/16/21 124/66   12/15/21 (!) 151/96       NPO Status: Time of last liquid consumption: 1800                        Time of last solid consumption: 1930                        Date of last liquid consumption: 05/26/22                        Date of last solid food consumption: 05/25/22    BMI:   Wt Readings from Last 3 Encounters:   05/27/22 190 lb (86.2 kg)   05/18/22 189 lb (85.7 kg)   05/03/22 207 lb (93.9 kg)     Body mass index is 28.06 kg/m².     CBC:   Lab Results   Component Value Date    WBC 6.4 05/19/2022    RBC 4.21 05/19/2022    HGB 12.5 05/19/2022    HCT 37.2 05/19/2022    MCV 88.4 05/19/2022    RDW 16.1 05/19/2022     05/19/2022     LR    CMP:   Lab Results   Component Value Date     05/19/2022    K 4.2 05/19/2022  05/19/2022    CO2 26 05/19/2022    BUN 33 05/19/2022    CREATININE 1.36 05/19/2022    GFRAA >60 05/19/2022    LABGLOM 53 05/19/2022    GLUCOSE 203 05/19/2022    PROT 7.0 12/02/2021    CALCIUM 9.1 05/19/2022    BILITOT 0.47 12/02/2021    ALKPHOS 85 12/02/2021    AST 16 12/02/2021    ALT 18 12/02/2021       POC Tests: No results for input(s): POCGLU, POCNA, POCK, POCCL, POCBUN, POCHEMO, POCHCT in the last 72 hours. Coags:   Lab Results   Component Value Date    PROTIME 13.2 02/24/2014    INR 1.3 02/24/2014       HCG (If Applicable): No results found for: PREGTESTUR, PREGSERUM, HCG, HCGQUANT     ABGs: No results found for: PHART, PO2ART, QZP1BAU, BHU4DPC, BEART, Y9LPGVZQ     Type & Screen (If Applicable):  No results found for: LABABO, LABRH    Drug/Infectious Status (If Applicable):  No results found for: HIV, HEPCAB    COVID-19 Screening (If Applicable):   Lab Results   Component Value Date    COVID19 Not Detected 01/21/2021           Anesthesia Evaluation  Patient summary reviewed and Nursing notes reviewed no history of anesthetic complications:   Airway: Mallampati: II  TM distance: >3 FB   Neck ROM: full  Mouth opening: > = 3 FB   Dental:          Pulmonary:normal exam  breath sounds clear to auscultation  (+) pneumonia: no interval change,      (-) COPD, asthma, shortness of breath, recent URI, sleep apnea, rhonchi, wheezes, rales, stridor, not a current smoker and no decreased breath sounds          Patient did not smoke on day of surgery.                  Cardiovascular:  Exercise tolerance: no interval change,   (+) hypertension: no interval change,     (-) pacemaker, valvular problems/murmurs, past MI, CAD, CABG/stent, dysrhythmias,  angina,  CHF, orthopnea, PND,  CROCKER, murmur, weak pulses,  friction rub, systolic click, carotid bruit,  JVD, peripheral edema, no pulmonary hypertension and no hyperlipidemia    ECG reviewed  Rhythm: regular  Rate: normal           Beta Blocker:  Dose within 24 Hrs         Neuro/Psych:   Negative Neuro/Psych ROS     (-) seizures, neuromuscular disease, TIA, CVA, headaches, psychiatric history and depression/anxiety            GI/Hepatic/Renal: Neg GI/Hepatic/Renal ROS       (-) hiatal hernia, GERD, PUD, hepatitis, liver disease, no renal disease, bowel prep and no morbid obesity       Endo/Other:    (+) DiabetesType II DM, no interval change, , no malignancy/cancer. (-) hypothyroidism, hyperthyroidism, blood dyscrasia, arthritis, no electrolyte abnormalities, no malignancy/cancer               Abdominal:             Vascular: negative vascular ROS. - PVD, DVT and PE. Other Findings:           Anesthesia Plan      general     ASA 3       Induction: intravenous. MIPS: Postoperative opioids intended and Prophylactic antiemetics administered. Anesthetic plan and risks discussed with patient. Plan discussed with CRNA.                     Isela Perdomo MD   5/27/2022

## 2022-05-27 NOTE — OP NOTE
Operative Note      Patient: Barbara Rogers  YOB: 1955  MRN: [id number]    Date of Procedure: 5/27/2022    PROCEDURE NOTE    DATE OF PROCEDURE: 5/27/2022    SURGEON: Patrick Barnett MD    ASSISTANT: None    PREOPERATIVE DIAGNOSIS: History of colon polyp    POSTOPERATIVE DIAGNOSIS: Transverse colon polyp    OPERATION: Total colonoscopy to cecum with intubation of terminal ileum and transverse colon polypectomy with cold biopsy forceps    ANESTHESIA: General    ESTIMATED BLOOD LOSS: None    COMPLICATIONS: None     SPECIMENS:  Was Obtained: Transverse colon polyp    HISTORY: The patient is a 73y.o. year old male with history of above preop diagnosis. I recommended colonoscopy with possible biopsy or polypectomy and I explained the risk, benefits, expected outcome, and alternatives to the procedure. Risks included but are not limited to bleeding, infection, respiratory distress, hypotension, and perforation of the colon and possibility of missing a lesion. The patient understands and is in agreement. PROCEDURE: The patient was given IV conscious sedation. The patient's SPO2 remained above 90% throughout the procedure. Digital rectal exam was normal.  The colonoscope was inserted through the anus into the rectum and advanced under direct vision to the cecum without difficulty. Terminal ileum was examined for approximately 2 inches. The prep was good. Findings:  Terminal ileum: normal    Cecum/Ascending colon: normal    Transverse colon: abnormal: Proximal transverse colon polyp removed with cold biopsy forceps    Descending/Sigmoid colon: normal    Rectum/Anus: examined in normal and retroflexed positions and was normal    Withdrawal Time was (minutes): 18      Next screening colonoscopy: 5 years. If screening is less than 10 years the recommended reason is due:polyps    The colon was decompressed. While withdrawing the scope the above findings were verified and the scope was removed.   The patient tolerated the procedure and conscious sedation without unusual events. In the recovery room patient was examined and remains hemodynamically stable. Discharge home when criteria met. Recommendations/Plan:   1. F/U Biopsies  2. F/U In Office as instructed  3. Discussed with the family  4. High fiber diet   5.  Precautions to avoid constipation    Electronically signed by Paola Tang MD  on 5/27/2022 at 11:37 AM

## 2022-05-27 NOTE — H&P
HISTORY and [facility] [id number]       NAME:  Sam Carranza  MRN: [id number]   YOB: 1955   Date: 5/27/2022   Age: 73 y.o. Gender: male       COMPLAINT AND PRESENT HISTORY:   Sam Carranza is 73 y.o.,   male, having a Diagnostic Colonoscopy. Prior Colonoscopy was done  2019 with polyp removed. Pre diagnosis: HISTORY OF COLON POLYPS  HPI:  Patient has hx of Colon Polyps, no  hx of Diverticulosis. Patient denies any  FH of Colon Cancer. Patient reports no changes in bowel habits. No constipation or diarrhea, No GI /Rectal bleeding, experiencing red/ black/ BRBPR stools. Patient has no history of abd. pain, no nausea or vomiting, no abdominal bloating or weight loss. Patient denies any Dysphagia. Pt has hx of GERD and he  is on a PPI. that is helping to control symptoms. Pt denies fever/chills, chest pain or SOB.     Review of additional significant medical hx:  HTN, HLD, LVH, palpitation pt had ablation of dysrhythmic focus 6/4/2021  Currently medication r/t condition : ASA, Lipitor, coreg, Benicar HCT     BP Readings from Last 3 Encounters:   12/16/21 124/66   12/15/21 (!) 151/96   07/23/19 132/64     Pulse Readings from Last 3 Encounters:   12/16/21 95   07/23/19 60   05/30/19 59       ECG 5/19/22  Narrative & Impression  Normal sinus rhythm  Normal ECG  When compared with ECG of 19-MAY-2022 13:52, (unconfirmed)  No significant change was found    DMII  Currently medication r/t condition : Metformin, Acots    Lab Results   Component Value Date    LABA1C 7.2 (H) 12/02/2021     Lab Results   Component Value Date     12/02/2021     Lab Results   Component Value Date    WBC 6.4 05/19/2022    HGB 12.5 (L) 05/19/2022    HCT 37.2 (L) 05/19/2022    MCV 88.4 05/19/2022     05/19/2022     Lab Results   Component Value Date     05/19/2022    K 4.2 05/19/2022     05/19/2022    CO2 26 05/19/2022    BUN 33 05/19/2022    CREATININE 1.36 05/19/2022 REPAIR         FAMILY HISTORY       Family History   Problem Relation Age of Onset    High Blood Pressure Father     Diabetes Father     Cancer Father         Skin    Heart Disease Maternal Grandfather     Diabetes Paternal Grandmother     Diabetes Paternal Grandfather        SOCIAL HISTORY       Social History     Socioeconomic History    Marital status:      Spouse name: Not on file    Number of children: Not on file    Years of education: Not on file    Highest education level: Not on file   Occupational History    Not on file   Tobacco Use    Smoking status: Never Smoker    Smokeless tobacco: Current User     Types: Chew    Tobacco comment: chew on weekends   Vaping Use    Vaping Use: Never used   Substance and Sexual Activity    Alcohol use: Yes     Comment: rare    Drug use: Never    Sexual activity: Yes     Partners: Female   Other Topics Concern    Not on file   Social History Narrative    Not on file     Social Determinants of Health     Financial Resource Strain:     Difficulty of Paying Living Expenses: Not on file   Food Insecurity:     Worried About Running Out of Food in the Last Year: Not on file    Kenisha of Food in the Last Year: Not on file   Transportation Needs:     Lack of Transportation (Medical): Not on file    Lack of Transportation (Non-Medical):  Not on file   Physical Activity:     Days of Exercise per Week: Not on file    Minutes of Exercise per Session: Not on file   Stress:     Feeling of Stress : Not on file   Social Connections:     Frequency of Communication with Friends and Family: Not on file    Frequency of Social Gatherings with Friends and Family: Not on file    Attends [employer] Services: Not on file    Active Member of Clubs or Organizations: Not on file    Attends Club or Organization Meetings: Not on file    Marital Status: Not on file   Intimate Partner Violence:     Fear of Current or Ex-Partner: Not on file    Emotionally Abused: Not on file    Physically Abused: Not on file    Sexually Abused: Not on file   Housing Stability:     Unable to Pay for Housing in the Last Year: Not on file    Number of Places Lived in the Last Year: Not on file    Unstable Housing in the Last Year: Not on file           REVIEW OF SYSTEMS      Allergies   Allergen Reactions    Penicillins        No current facility-administered medications on file prior to encounter. Current Outpatient Medications on File Prior to Encounter   Medication Sig Dispense Refill    pioglitazone (ACTOS) 30 MG tablet Take 30 mg by mouth daily      aspirin EC 81 MG EC tablet Take 1 tablet by mouth 2 times daily 90 tablet 0    Dulaglutide (TRULICITY) 1.5 AY/2.6OB SOPN Inject 1.5 mg into the skin once a week Sunday's       Multiple Vitamins-Minerals (THERAPEUTIC MULTIVITAMIN-MINERALS) tablet Take 1 tablet by mouth daily      Ascorbic Acid (VITAMIN C) 250 MG tablet Take 1,000 mg by mouth daily      Misc Natural Products (GLUCOSAMINE CHOND CMP ADVANCED PO) Take 2 tablets by mouth daily takes (Glucosamine Chondroitin w/Vit D & MSM)1500mg/160 mg/ 50mg/115mg      NONFORMULARY Take 2 tablets by mouth daily Vitamin B 1,000 MCG daily      olmesartan-hydrochlorothiazide (BENICAR HCT) 40-25 MG per tablet Take 1 tablet by mouth daily      atorvastatin (LIPITOR) 80 MG tablet Take 80 mg by mouth daily.  omeprazole (PRILOSEC) 20 MG capsule Take 20 mg by mouth daily.  metFORMIN (GLUCOPHAGE) 1000 MG tablet Take 1,000 mg by mouth 2 times daily (with meals).  carvedilol (COREG) 25 MG tablet Take 12.5 mg by mouth 2 times daily (with meals)          Review of Systems   Constitutional: Negative. HENT: Negative. Eyes: Positive for visual disturbance. Eye glasses   Respiratory: Negative. Cardiovascular: Negative. Gastrointestinal: Negative. Genitourinary: Negative. Musculoskeletal: Negative. Skin: Negative. Hematological: Negative. Psychiatric/Behavioral: Negative. GENERAL PHYSICAL EXAM     Vitals: [facility] nursing flow sheet for vital signs     GENERAL APPEARANCE:   Claudeen Snooks is 73 y.o.,  male, not obese, nourished, conscious, alert. Does not appear to be distress or pain at this time. Physical Exam  Constitutional:       Appearance: Normal appearance. He is normal weight. HENT:      Head: Normocephalic. Right Ear: External ear normal.      Left Ear: External ear normal.      Nose: Nose normal.      Mouth/Throat:      Mouth: Mucous membranes are moist.   Eyes:      General:         Right eye: No discharge. Left eye: No discharge. Cardiovascular:      Rate and Rhythm: Normal rate and regular rhythm. Pulses: Normal pulses. Radial pulses are 2+ on the right side and 2+ on the left side. Dorsalis pedis pulses are 2+ on the right side and 2+ on the left side. Posterior tibial pulses are 2+ on the right side and 2+ on the left side. Heart sounds: Normal heart sounds. No murmur heard. No gallop. Pulmonary:      Effort: Pulmonary effort is normal. No respiratory distress. Breath sounds: Normal breath sounds. No wheezing or rales. Abdominal:      General: Bowel sounds are normal. There is no distension. Palpations: Abdomen is soft. Tenderness: There is no abdominal tenderness. Hernia: No hernia is present. Musculoskeletal:         General: Normal range of motion. Cervical back: Normal range of motion and neck supple. Right lower leg: No edema. Left lower leg: No edema. Skin:     General: Skin is warm and dry. Findings: No bruising or erythema. Neurological:      General: No focal deficit present. Mental Status: He is alert and oriented to person, place, and time. Motor: No weakness.       Gait: Gait normal.   Psychiatric:         Mood and Affect: Mood normal.         Behavior: Behavior normal. PROVISIONAL DIAGNOSES / SURGERY:    HISTORY OF COLON POLYPS  COLONOSCOPY DIAGNOSTIC  Patient Active Problem List    Diagnosis Date Noted    LVH (left ventricular hypertrophy) 05/27/2021    Abnormal EKG 04/23/2021    Chest tightness 04/23/2021    Chronic kidney disease, stage 3 ([facility] [address] 04/23/2021    History of COVID-19 04/23/2021    Intermittent palpitations 04/23/2021    Pneumonia due to COVID-19 virus 11/15/2020    Near syncope 11/14/2020    History of right hip replacement 12/06/2016    Status post right hip replacement 02/27/2014    Arthritis, hip 01/21/2014    Knee pain 12/17/2013    Hip pain 12/17/2013           LATANYA Velázquez - CNP on 5/27/2022 at 8:45 AM

## 2022-05-27 NOTE — ANESTHESIA POSTPROCEDURE EVALUATION
POST- ANESTHESIA EVALUATION       Pt Name: Catrachita Cordova  MRN: [id number]  YOB: 1955  Date of evaluation: 5/27/2022  Time:  2:06 PM      /72   Pulse 71   Temp 97.5 °F (36.4 °C) (Temporal)   Resp 16   Ht 5' 9\" (1.753 m)   Wt 190 lb (86.2 kg)   SpO2 97%   BMI 28.06 kg/m²      Consciousness Level  Awake  Cardiopulmonary Status  Stable  Pain Adequately Treated YES  Nausea / Vomiting  NO  Adequate Hydration  YES  Anesthesia Related Complications NONE      Electronically signed by Isela Perdomo MD on 5/27/2022 at 2:06 PM       Department of Anesthesiology  Postprocedure Note    Patient: Catrachita Cordova  MRN: [id number]  YOB: 1955  Date of evaluation: 5/27/2022  Time:  2:06 PM     Procedure Summary     Date: 05/27/22 Room / Location: [facility]    Anesthesia Start: 1055 Anesthesia Stop: 1138    Procedure: COLONOSCOPY POLYPECTOMY COLD BIOPSY (N/A Anus) Diagnosis:       History of colon polyps      (HISTORY OF COLON POLYPS)    Surgeons: Margot Matta MD Responsible Provider: Isela Perdomo MD    Anesthesia Type: general ASA Status: 3          Anesthesia Type: No value filed. Karen Phase I: Karen Score: 10    Karen Phase II: Karen Score: 10    Last vitals: Reviewed and per EMR flowsheets.        Anesthesia Post Evaluation

## 2022-05-31 LAB — SURGICAL PATHOLOGY REPORT: NORMAL

## 2022-05-31 RX ORDER — ASPIRIN 81 MG/1
TABLET, DELAYED RELEASE ORAL
Qty: 90 TABLET | Refills: 0 | OUTPATIENT
Start: 2022-05-31

## 2023-01-03 ENCOUNTER — OFFICE VISIT (OUTPATIENT)
Dept: ORTHOPEDIC SURGERY | Age: 68
End: 2023-01-03
Payer: COMMERCIAL

## 2023-01-03 VITALS — RESPIRATION RATE: 12 BRPM | HEIGHT: 69 IN | BODY MASS INDEX: 28.14 KG/M2 | WEIGHT: 190 LBS

## 2023-01-03 DIAGNOSIS — Z96.642 HISTORY OF TOTAL HIP ARTHROPLASTY, LEFT: Primary | ICD-10-CM

## 2023-01-03 PROCEDURE — 3017F COLORECTAL CA SCREEN DOC REV: CPT | Performed by: ORTHOPAEDIC SURGERY

## 2023-01-03 PROCEDURE — G8417 CALC BMI ABV UP PARAM F/U: HCPCS | Performed by: ORTHOPAEDIC SURGERY

## 2023-01-03 PROCEDURE — G8484 FLU IMMUNIZE NO ADMIN: HCPCS | Performed by: ORTHOPAEDIC SURGERY

## 2023-01-03 PROCEDURE — 99213 OFFICE O/P EST LOW 20 MIN: CPT | Performed by: ORTHOPAEDIC SURGERY

## 2023-01-03 PROCEDURE — G8427 DOCREV CUR MEDS BY ELIG CLIN: HCPCS | Performed by: ORTHOPAEDIC SURGERY

## 2023-01-03 PROCEDURE — 1124F ACP DISCUSS-NO DSCNMKR DOCD: CPT | Performed by: ORTHOPAEDIC SURGERY

## 2023-01-03 PROCEDURE — 4004F PT TOBACCO SCREEN RCVD TLK: CPT | Performed by: ORTHOPAEDIC SURGERY

## 2023-01-03 NOTE — PROGRESS NOTES
Patient ID: Fariha Hahn is a 78 y.o. male    Chief Compliant:  Chief Complaint   Patient presents with    Back Pain     lumbar        Diagnostic imaging:    AP pelvis lateral left hip status post bilateral total hip arthroplasty components look stable    Assessment and Plan:  1. History of total hip arthroplasty, left        1 year post left MONICA, great outcome    Follow up 5 years    HPI:  This is a 78 y.o. male who presents to the clinic today for 1 year post left MONICA, 12/15/21. Patient states he is doing well    Review of Systems   All other systems reviewed and are negative. Past History:    Current Outpatient Medications:     pioglitazone (ACTOS) 30 MG tablet, Take 30 mg by mouth daily, Disp: , Rfl:     aspirin EC 81 MG EC tablet, Take 1 tablet by mouth 2 times daily, Disp: 90 tablet, Rfl: 0    Dulaglutide (TRULICITY) 1.5 SJ/4.0AG SOPN, Inject 1.5 mg into the skin once a week Sunday's , Disp: , Rfl:     Multiple Vitamins-Minerals (THERAPEUTIC MULTIVITAMIN-MINERALS) tablet, Take 1 tablet by mouth daily, Disp: , Rfl:     Ascorbic Acid (VITAMIN C) 250 MG tablet, Take 1,000 mg by mouth daily, Disp: , Rfl:     Misc Natural Products (GLUCOSAMINE CHOND CMP ADVANCED PO), Take 2 tablets by mouth daily takes (Glucosamine Chondroitin w/Vit D & MSM)1500mg/160 mg/ 50mg/115mg, Disp: , Rfl:     NONFORMULARY, Take 2 tablets by mouth daily Vitamin B 1,000 MCG daily, Disp: , Rfl:     olmesartan-hydrochlorothiazide (BENICAR HCT) 40-25 MG per tablet, Take 1 tablet by mouth daily, Disp: , Rfl:     atorvastatin (LIPITOR) 80 MG tablet, Take 80 mg by mouth daily. , Disp: , Rfl:     omeprazole (PRILOSEC) 20 MG capsule, Take 20 mg by mouth daily. , Disp: , Rfl:     metFORMIN (GLUCOPHAGE) 1000 MG tablet, Take 1,000 mg by mouth 2 times daily (with meals). , Disp: , Rfl:     carvedilol (COREG) 25 MG tablet, Take 12.5 mg by mouth 2 times daily (with meals) , Disp: , Rfl:   Allergies   Allergen Reactions    Penicillins      Social History     Socioeconomic History    Marital status:      Spouse name: Not on file    Number of children: Not on file    Years of education: Not on file    Highest education level: Not on file   Occupational History    Not on file   Tobacco Use    Smoking status: Never    Smokeless tobacco: Current     Types: Chew    Tobacco comments:     chew on weekends   Vaping Use    Vaping Use: Never used   Substance and Sexual Activity    Alcohol use: Yes     Comment: rare    Drug use: Never    Sexual activity: Yes     Partners: Female   Other Topics Concern    Not on file   Social History Narrative    Not on file     Social Determinants of Health     Financial Resource Strain: Not on file   Food Insecurity: Not on file   Transportation Needs: Not on file   Physical Activity: Not on file   Stress: Not on file   Social Connections: Not on file   Intimate Partner Violence: Not on file   Housing Stability: Not on file     Past Medical History:   Diagnosis Date    Abnormal EKG 4/23/2021    Arthritis of left hip     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) ([facility] [address]     Chest tightness 04/23/2021    Chronic kidney disease, stage 3 ([facility] [address] 04/23/2021    History of COVID-19 04/23/2021    Hyperlipidemia     Hypertension     Intermittent palpitations 04/23/2021    Left hip pain     LVH (left ventricular hypertrophy) 05/27/2021    Near syncope 11/14/2020    Osteoarthritis     Pneumonia due to COVID-19 virus 11/15/2020    Type II or unspecified type diabetes mellitus without mention of complication, not stated as uncontrolled     Wears glasses      Past Surgical History:   Procedure Laterality Date    ABLATION OF DYSRHYTHMIC FOCUS  06/04/2021    AVNRT Ablation- CARTO; Surgeon: Sobeida Fountain MD; Location: [facility]); Service: Cardiology;  Laterality: N/A;    APPENDECTOMY      CATARACT EXTRACTION W/  INTRAOCULAR LENS IMPLANT Right     COLONOSCOPY N/A 05/30/2019    COLONOSCOPY POLYPECTOMY HOT BIOPSY performed by Torsten Moncada MD at [address] COLONOSCOPY N/A 5/27/2022    COLONOSCOPY POLYPECTOMY COLD BIOPSY performed by Ezekiel Brunson MD at [address] Left     orbital wall fracture. HAND SURGERY Right     joint replacement. INGUINAL HERNIA REPAIR Bilateral     JOINT REPLACEMENT      total right hip Feb 2014  Dr. Clement Deep Left     bone shaved, and impingement. TONSILLECTOMY AND ADENOIDECTOMY      TOTAL HIP ARTHROPLASTY Left 12/15/2021    HIP TOTAL ARTHROPLASTY performed by Jodi Long MD at [address]       Family History   Problem Relation Age of Onset    High Blood Pressure Father     Diabetes Father     Cancer Father         Skin    Heart Disease Maternal Grandfather     Diabetes Paternal Grandmother     Diabetes Paternal Grandfather         Physical Exam:  Vitals signs and nursing note reviewed. Constitutional:       Appearance: well-developed. HENT:      Head: Normocephalic and atraumatic. Nose: Nose normal.   Eyes:      Conjunctiva/sclera: Conjunctivae normal.   Neck:      Musculoskeletal: Normal range of motion and neck supple. Pulmonary:      Effort: Pulmonary effort is normal. No respiratory distress. Musculoskeletal:      Comments: Normal gait     Skin:     General: Skin is warm and dry. Neurological:      Mental Status: Alert and oriented to person, place, and time. Sensory: No sensory deficit. Psychiatric:         Behavior: Behavior normal.         Thought Content: Thought content normal.        Provider Attestation:  Miller Collado, personally performed the services described in this documentation. All medical record entries made by the scribe were at my direction and in my presence. I have reviewed the chart and discharge instructions and agree that the records reflect my personal performance and is accurate and complete. Argelia Garcia MD 1/3/23       Scribe Attestation:  By signing my name below, Kailey Goodson, attest that this documentation has been prepared under the direction and in the presence of Dr. Andressa Myers. Electronically signed: Vesna Sotomayor, 1/3/23     Please note that this chart was generated using voice recognition Dragon dictation software. Although every effort was made to ensure the accuracy of this automated transcription, some errors in transcription may have occurred.

## (undated) DEVICE — Device

## (undated) DEVICE — Z DISCONTINUED USE 2424143 ADAPTER O2 SWVL CHRISTMAS TREE GRN

## (undated) DEVICE — Z DUPLICATE USE 2527422 TUBING O2 STD 7 FT EXTN NO CRUSH VISUAL CNTRST LF

## (undated) DEVICE — 4-PORT MANIFOLD: Brand: NEPTUNE 2

## (undated) DEVICE — SUTURE VCRL + SZ 2 L27IN ABSRB UD L40MM CP 1/2 CIR REV CUT VCP195H

## (undated) DEVICE — DRSG POSTOP PRMSL AG 3.5X14IN

## (undated) DEVICE — CANNULA NSL AD TBNG L7FT PVC STR NONFLARED PRNG O2 DEL W STD

## (undated) DEVICE — DEFENDO AIR WATER SUCTION AND BIOPSY VALVE KIT FOR  OLYMPUS: Brand: DEFENDO AIR/WATER/SUCTION AND BIOPSY VALVE

## (undated) DEVICE — GOWN,POLY REINFORCED,LG: Brand: MEDLINE

## (undated) DEVICE — MERCY HEALTH ST CHARLES: Brand: MEDLINE INDUSTRIES, INC.

## (undated) DEVICE — ENDO KIT W/SYRINGE: Brand: MEDLINE INDUSTRIES, INC.

## (undated) DEVICE — SOLUTION IRRIG 3000ML 0.9% SOD CHL USP UROMATIC PLAS CONT

## (undated) DEVICE — SOLUTION IRRIG 1000ML STRL H2O USP PLAS POUR BTL

## (undated) DEVICE — SYRINGE MED 50ML LUERSLIP TIP

## (undated) DEVICE — FORCEPS BX L240CM JAW DIA2.4MM ORNG L CAP W/ NDL DISP RAD

## (undated) DEVICE — GLOVE ORANGE PI 7 1/2   MSG9075

## (undated) DEVICE — SHEET, ORTHO, SPLIT, STERILE: Brand: MEDLINE

## (undated) DEVICE — SUTURE VCRL + SZ 2-0 L27IN ABSRB UD CP-1 1/2 CIR REV CUT VCP266H

## (undated) DEVICE — FORCEPS BX L L240CM DIA2.4MM RAD JAW 4 HOT FOR POLYP DISP

## (undated) DEVICE — 3M™ STERI-DRAPE™ U-DRAPE 1015: Brand: STERI-DRAPE™

## (undated) DEVICE — DRESSING ALGINATE POST OPERATIVE 12X3.5 IN RECT PRIMASEAL

## (undated) DEVICE — JELLY,LUBE,STERILE,FLIP TOP,TUBE,2-OZ: Brand: MEDLINE

## (undated) DEVICE — BLANKET WRM W29.9XL79.1IN UP BODY FORC AIR MISTRAL-AIR

## (undated) DEVICE — 2108 SERIES SAGITTAL BLADE, NO OFFSET  (24.8 X 1.32 X 87.3MM)

## (undated) DEVICE — 450 ML BOTTLE OF 0.05% CHLORHEXIDINE GLUCONATE IN 99.95% STERILE WATER FOR IRRIGATION, USP AND APPLICATOR.: Brand: IRRISEPT ANTIMICROBIAL WOUND LAVAGE

## (undated) DEVICE — FORCEPS BX L240CM WRK CHN 2.8MM STD CAP W/ NDL MIC MESH

## (undated) DEVICE — Z INACTIVE USE 2525529 CONNECTOR TBNG FOR O2

## (undated) DEVICE — BANDAGE COBAN 6 IN WND 6INX5YD FOAM

## (undated) DEVICE — GLOVE ORTHO 8   MSG9480

## (undated) DEVICE — GLOVE SURG SZ 8 L12IN FNGR THK79MIL GRN LTX FREE